# Patient Record
Sex: FEMALE | Race: WHITE | Employment: OTHER | URBAN - METROPOLITAN AREA
[De-identification: names, ages, dates, MRNs, and addresses within clinical notes are randomized per-mention and may not be internally consistent; named-entity substitution may affect disease eponyms.]

---

## 2017-04-11 ENCOUNTER — OFFICE VISIT (OUTPATIENT)
Dept: NEUROLOGY | Facility: CLINIC | Age: 37
End: 2017-04-11

## 2017-04-11 VITALS
HEIGHT: 62 IN | BODY MASS INDEX: 29.44 KG/M2 | RESPIRATION RATE: 16 BRPM | WEIGHT: 160 LBS | SYSTOLIC BLOOD PRESSURE: 116 MMHG | DIASTOLIC BLOOD PRESSURE: 82 MMHG | HEART RATE: 96 BPM

## 2017-04-11 DIAGNOSIS — IMO0002 CHRONIC MIGRAINE: Primary | ICD-10-CM

## 2017-04-11 DIAGNOSIS — M54.2 NECK PAIN: ICD-10-CM

## 2017-04-11 PROCEDURE — 99213 OFFICE O/P EST LOW 20 MIN: CPT | Performed by: OTHER

## 2017-04-11 RX ORDER — DIAZEPAM 5 MG/1
5 TABLET ORAL EVERY 12 HOURS PRN
COMMUNITY

## 2017-04-11 NOTE — PROGRESS NOTES
[facility]    Laurent Ramirez is a 31year old female. HPI:     Patient is being seen in follow-up. I saw her in the clinic last in 11/15/16, for Botox injections (second set).   She does not feel she got as much of a response ibuprofen 200 MG Oral Tab Take 200 mg by mouth every 6 (six) hours as needed for Pain. Disp:  Rfl:    metoprolol Tartrate 25 MG Oral Tab Take 25 mg by mouth.  Disp:  Rfl:    DULoxetine HCl 30 MG Oral Cap DR Particles Take 30 mg by mouth 3 (three) times da dysmetria, normal gait    ASSESSMENT/PLAN:   Assessment  1. Chronic migraine  2.  Neck pain    –Patient would benefit from repeat Botox injections; will use same injection schema as that used 8/17/16 as patient appears to have had a better response to this

## 2017-04-12 ENCOUNTER — TELEPHONE (OUTPATIENT)
Dept: NEUROLOGY | Facility: CLINIC | Age: 37
End: 2017-04-12

## 2017-04-12 NOTE — TELEPHONE ENCOUNTER
BRS on-line for Botox, Faxed clinical notes to [facility] [phone] for Authorization of Approval for Botox injection procedure

## 2017-04-18 NOTE — TELEPHONE ENCOUNTER
Rx was called in to [facility] for Botox 200 units Sig: Inject up to 155 units to head/neck IM by MD every 3 months 1 vial with 3 refills. Per Ping Bhat., they will verify coverage then contact Pt. for co pay and consent for delivery. Wi

## 2017-04-18 NOTE — TELEPHONE ENCOUNTER
Received fax from [facility]  advising of approval for Botox 200 units  cpt code 35196. Per [employer] - no authorization is required for  and cpt code 62750. Reference # [id number]. Will call [facility] pharmacy to request Botox. Robb Perea

## 2017-04-24 ENCOUNTER — TELEPHONE (OUTPATIENT)
Dept: NEUROLOGY | Facility: CLINIC | Age: 37
End: 2017-04-24

## 2017-04-24 NOTE — TELEPHONE ENCOUNTER
Pt. called with update. States she received a phone message advising they called but nothing regarding Botox co pay or consent for delivery. Informed I will call to check on status of Botox. I spoke to [facility] and he said he would [place]

## 2017-04-28 NOTE — TELEPHONE ENCOUNTER
Called [facility] [address] [phone] t/t Christie Arauz to F/U on order called in for Botox, patient is scheduled for procedure on 05/09/2017, Jn Alicea transferred me to their PA Department t/t Alt for information , per Jn Alicea [place] stated more information was nee

## 2017-04-28 NOTE — TELEPHONE ENCOUNTER
Pt. received call from [employer] advising she has a co pay of $1000 and cannot afford to pay upfront even though she will be reimbursed afterwards. She also called Botox Pt. Assistance and unfortunately is not eligible.  Will inform Dr. Carolynn Delgadillo for [phone]

## 2017-05-03 NOTE — TELEPHONE ENCOUNTER
Patient's Botox order will be shipped on 05/03/2017 and delivered 05/04/2017, patient has an appointment for 05/09/2017

## 2017-05-09 ENCOUNTER — OFFICE VISIT (OUTPATIENT)
Dept: NEUROLOGY | Facility: CLINIC | Age: 37
End: 2017-05-09

## 2017-05-09 VITALS
BODY MASS INDEX: 29.44 KG/M2 | HEIGHT: 62 IN | DIASTOLIC BLOOD PRESSURE: 70 MMHG | OXYGEN SATURATION: 97 % | SYSTOLIC BLOOD PRESSURE: 108 MMHG | RESPIRATION RATE: 14 BRPM | WEIGHT: 160 LBS | HEART RATE: 65 BPM

## 2017-05-09 DIAGNOSIS — IMO0002 CHRONIC MIGRAINE: Primary | ICD-10-CM

## 2017-05-09 PROCEDURE — 64615 CHEMODENERV MUSC MIGRAINE: CPT | Performed by: OTHER

## 2017-05-09 NOTE — PROCEDURES
Botox Injection Procedure Note      Consent:    Risks, benefits, and alternatives of botulinum toxin injections were discussed with patient in detail, risks including but not limited to pain, eyelid weakness, double vision, difficulty swallowing, difficult

## 2017-05-19 ENCOUNTER — CHARTING TRANS (OUTPATIENT)
Dept: OTHER | Age: 37
End: 2017-05-19

## 2017-05-19 ASSESSMENT — PAIN SCALES - GENERAL: PAINLEVEL_OUTOF10: 8

## 2017-05-31 ENCOUNTER — TELEPHONE (OUTPATIENT)
Dept: NEUROLOGY | Facility: CLINIC | Age: 37
End: 2017-05-31

## 2017-06-19 ENCOUNTER — TELEPHONE (OUTPATIENT)
Dept: NEUROLOGY | Facility: CLINIC | Age: 37
End: 2017-06-19

## 2017-06-19 NOTE — TELEPHONE ENCOUNTER
Patient was seen for Botox injection on 5/09/17. Since that time, the headaches continue to occur 2-3 times per week, and the intensity of the headaches has only improved slightly. Please advise.

## 2017-06-23 DIAGNOSIS — IMO0002 CHRONIC MIGRAINE: Primary | ICD-10-CM

## 2017-06-23 NOTE — TELEPHONE ENCOUNTER
Spoke with patient. Due to variant significant improvement, will not proceed with further Botox injections for the time being. Did discuss with her second opinion evaluation to a headache clinic, to which she is amenable. Referral placed.

## 2017-06-26 NOTE — TELEPHONE ENCOUNTER
Pt. informed insurance was verified and referral to Headache specialist for 2nd opinion is a covered benefit and does not require authorization. Can proceed with scheduling appt.

## 2017-07-17 ENCOUNTER — TELEPHONE (OUTPATIENT)
Dept: NEUROLOGY | Facility: CLINIC | Age: 37
End: 2017-07-17

## 2018-02-22 ENCOUNTER — TELEPHONE (OUTPATIENT)
Dept: NEUROLOGY | Facility: CLINIC | Age: 38
End: 2018-02-22

## 2018-02-22 NOTE — TELEPHONE ENCOUNTER
Patient's last office visit on 5/9/17. No follow-up appointment since then. No paperwork received at office. Patient will need to make an appointment if paperwork needs to be filled out. Left detailed message for patient notifying her of above.

## 2018-03-01 ENCOUNTER — TELEPHONE (OUTPATIENT)
Dept: NEUROLOGY | Facility: CLINIC | Age: 38
End: 2018-03-01

## 2018-11-03 VITALS
RESPIRATION RATE: 16 BRPM | DIASTOLIC BLOOD PRESSURE: 60 MMHG | TEMPERATURE: 98.1 F | OXYGEN SATURATION: 99 % | SYSTOLIC BLOOD PRESSURE: 108 MMHG | WEIGHT: 161.25 LBS | HEART RATE: 94 BPM | HEIGHT: 63 IN | BODY MASS INDEX: 28.57 KG/M2

## 2020-02-20 ENCOUNTER — TELEPHONE (OUTPATIENT)
Dept: GASTROENTEROLOGY | Facility: CLINIC | Age: 40
End: 2020-02-20

## 2020-02-20 ENCOUNTER — OFFICE VISIT (OUTPATIENT)
Dept: GASTROENTEROLOGY | Facility: CLINIC | Age: 40
End: 2020-02-20
Payer: MEDICARE

## 2020-02-20 VITALS
DIASTOLIC BLOOD PRESSURE: 88 MMHG | WEIGHT: 217 LBS | SYSTOLIC BLOOD PRESSURE: 131 MMHG | HEIGHT: 62 IN | HEART RATE: 91 BPM | BODY MASS INDEX: 39.93 KG/M2

## 2020-02-20 DIAGNOSIS — K21.9 GASTROESOPHAGEAL REFLUX DISEASE, ESOPHAGITIS PRESENCE NOT SPECIFIED: Primary | ICD-10-CM

## 2020-02-20 DIAGNOSIS — K58.2 IRRITABLE BOWEL SYNDROME WITH BOTH CONSTIPATION AND DIARRHEA: ICD-10-CM

## 2020-02-20 PROCEDURE — 99203 OFFICE O/P NEW LOW 30 MIN: CPT | Performed by: INTERNAL MEDICINE

## 2020-02-20 RX ORDER — BENZTROPINE MESYLATE 1 MG/1
TABLET ORAL
COMMUNITY
Start: 2020-02-05

## 2020-02-20 RX ORDER — BENZTROPINE MESYLATE 1 MG/1
1 TABLET ORAL
COMMUNITY
Start: 2020-02-05

## 2020-02-20 RX ORDER — TIZANIDINE 4 MG/1
12 TABLET ORAL
COMMUNITY
Start: 2020-02-05

## 2020-02-20 RX ORDER — NEBIVOLOL HYDROCHLORIDE 10 MG/1
TABLET ORAL
COMMUNITY
Start: 2019-12-22

## 2020-02-20 RX ORDER — NYSTATIN 100000 [USP'U]/G
POWDER TOPICAL
COMMUNITY
Start: 2020-01-15

## 2020-02-20 RX ORDER — GABAPENTIN 300 MG/1
300 CAPSULE ORAL
COMMUNITY
Start: 2020-02-05

## 2020-02-20 RX ORDER — LORAZEPAM 1 MG/1
TABLET ORAL
COMMUNITY
Start: 2020-02-05

## 2020-02-20 RX ORDER — HALOPERIDOL 2 MG/1
TABLET ORAL
COMMUNITY
Start: 2019-09-11

## 2020-02-20 RX ORDER — VENLAFAXINE HYDROCHLORIDE 150 MG/1
150 CAPSULE, EXTENDED RELEASE ORAL
COMMUNITY
Start: 2020-02-05

## 2020-02-20 RX ORDER — DIPHENHYDRAMINE HYDROCHLORIDE 50 MG/ML
INJECTION INTRAMUSCULAR; INTRAVENOUS
COMMUNITY
Start: 2020-01-24

## 2020-02-20 RX ORDER — GALCANEZUMAB 120 MG/ML
INJECTION, SOLUTION SUBCUTANEOUS
COMMUNITY
Start: 2020-01-24

## 2020-02-20 RX ORDER — IBUPROFEN 800 MG
TABLET ORAL
COMMUNITY

## 2020-02-20 NOTE — TELEPHONE ENCOUNTER
Scheduled for:  EGD 27968  Provider Name: Dr. Alicia Velazquez  Date:  2/26/20  Location:  [facility]  Sedation:  MAC  Time:   9247 (pt is aware to arrive at [address] [place] [address])   Prep:  NPO after midnight  Meds/Allergies Reconciled?:  Physician reviewed   Diagnosis with codes:  R

## 2020-02-20 NOTE — PROGRESS NOTES
HPI:    Patient ID: Yoana Zavala is a 42year old female. HPI  The patient is seen at the request of her primary care physician, Dr. Ulysses Keas to discuss gastroesophageal reflux and possible upper endoscopy.     The patient has a 10-year history of sy lb (78.9 kg)  09/14/16 : 180 lb (81.6 kg)  08/17/16 : 180 lb (81.6 kg)           Current Outpatient Medications   Medication Sig Dispense Refill   • Omeprazole Does not apply Powder Take by mouth.      • Benztropine Mesylate 1 MG Oral Tab Take 1 mg by mouth (three) times daily. • pregabalin 75 MG Oral Cap Take 75 mg by mouth 3 (three) times daily. • traZODone 25 mg Oral Tab Take 25 mg by mouth nightly as needed.        • HYDROcodone-acetaminophen  MG Oral Tab Take 1 tablet by mouth every 12 (twel its nature, aggravating and alleviating factors. Dietary and lifestyle modification were discussed.   Smoking cessation and weight loss would be optimal.  We discussed complications of gastroesophageal reflux including erosive esophagitis, stricture or Bar

## 2020-02-20 NOTE — PATIENT INSTRUCTIONS
1.  Try a fiber supplement on a daily basis in an effort to regulate bowel movements. 2.  May continue Prilosec. 3.  Schedule upper endoscopy for reflux with monitored anesthesia care. 4.  Screening colonoscopy at the age of 38.

## 2020-02-26 ENCOUNTER — ANESTHESIA EVENT (OUTPATIENT)
Dept: ENDOSCOPY | Facility: HOSPITAL | Age: 40
End: 2020-02-26
Payer: MEDICARE

## 2020-02-26 ENCOUNTER — HOSPITAL ENCOUNTER (OUTPATIENT)
Facility: HOSPITAL | Age: 40
Setting detail: HOSPITAL OUTPATIENT SURGERY
Discharge: HOME | End: 2020-02-26
Attending: INTERNAL MEDICINE | Admitting: INTERNAL MEDICINE
Payer: MEDICARE

## 2020-02-26 ENCOUNTER — ANESTHESIA (OUTPATIENT)
Dept: ENDOSCOPY | Facility: HOSPITAL | Age: 40
End: 2020-02-26
Payer: MEDICARE

## 2020-02-26 VITALS
BODY MASS INDEX: 39.56 KG/M2 | RESPIRATION RATE: 14 BRPM | HEIGHT: 62 IN | OXYGEN SATURATION: 100 % | DIASTOLIC BLOOD PRESSURE: 83 MMHG | SYSTOLIC BLOOD PRESSURE: 120 MMHG | HEART RATE: 91 BPM | WEIGHT: 215 LBS

## 2020-02-26 DIAGNOSIS — K21.9 GASTROESOPHAGEAL REFLUX DISEASE, ESOPHAGITIS PRESENCE NOT SPECIFIED: ICD-10-CM

## 2020-02-26 LAB — B-HCG UR QL: NEGATIVE

## 2020-02-26 PROCEDURE — 0DB38ZX EXCISION OF LOWER ESOPHAGUS, VIA NATURAL OR ARTIFICIAL OPENING ENDOSCOPIC, DIAGNOSTIC: ICD-10-PCS | Performed by: INTERNAL MEDICINE

## 2020-02-26 PROCEDURE — 0DB68ZX EXCISION OF STOMACH, VIA NATURAL OR ARTIFICIAL OPENING ENDOSCOPIC, DIAGNOSTIC: ICD-10-PCS | Performed by: INTERNAL MEDICINE

## 2020-02-26 PROCEDURE — 0DB98ZX EXCISION OF DUODENUM, VIA NATURAL OR ARTIFICIAL OPENING ENDOSCOPIC, DIAGNOSTIC: ICD-10-PCS | Performed by: INTERNAL MEDICINE

## 2020-02-26 PROCEDURE — 43239 EGD BIOPSY SINGLE/MULTIPLE: CPT | Performed by: INTERNAL MEDICINE

## 2020-02-26 PROCEDURE — 0DB18ZX EXCISION OF UPPER ESOPHAGUS, VIA NATURAL OR ARTIFICIAL OPENING ENDOSCOPIC, DIAGNOSTIC: ICD-10-PCS | Performed by: INTERNAL MEDICINE

## 2020-02-26 RX ORDER — MIDAZOLAM HYDROCHLORIDE 1 MG/ML
INJECTION INTRAMUSCULAR; INTRAVENOUS AS NEEDED
Status: DISCONTINUED | OUTPATIENT
Start: 2020-02-26 | End: 2020-02-26 | Stop reason: SURG

## 2020-02-26 RX ORDER — SODIUM CHLORIDE, SODIUM LACTATE, POTASSIUM CHLORIDE, CALCIUM CHLORIDE 600; 310; 30; 20 MG/100ML; MG/100ML; MG/100ML; MG/100ML
INJECTION, SOLUTION INTRAVENOUS CONTINUOUS
Status: DISCONTINUED | OUTPATIENT
Start: 2020-02-26 | End: 2020-02-26

## 2020-02-26 RX ORDER — NALOXONE HYDROCHLORIDE 0.4 MG/ML
80 INJECTION, SOLUTION INTRAMUSCULAR; INTRAVENOUS; SUBCUTANEOUS AS NEEDED
Status: DISCONTINUED | OUTPATIENT
Start: 2020-02-26 | End: 2020-02-26

## 2020-02-26 RX ORDER — LIDOCAINE HYDROCHLORIDE 10 MG/ML
INJECTION, SOLUTION EPIDURAL; INFILTRATION; INTRACAUDAL; PERINEURAL AS NEEDED
Status: DISCONTINUED | OUTPATIENT
Start: 2020-02-26 | End: 2020-02-26 | Stop reason: SURG

## 2020-02-26 RX ORDER — CETIRIZINE HYDROCHLORIDE 10 MG/1
10 TABLET ORAL DAILY
COMMUNITY

## 2020-02-26 RX ADMIN — MIDAZOLAM HYDROCHLORIDE 2 MG: 1 INJECTION INTRAMUSCULAR; INTRAVENOUS at 08:30:00

## 2020-02-26 RX ADMIN — SODIUM CHLORIDE, SODIUM LACTATE, POTASSIUM CHLORIDE, CALCIUM CHLORIDE: 600; 310; 30; 20 INJECTION, SOLUTION INTRAVENOUS at 08:43:00

## 2020-02-26 RX ADMIN — LIDOCAINE HYDROCHLORIDE 30 MG: 10 INJECTION, SOLUTION EPIDURAL; INFILTRATION; INTRACAUDAL; PERINEURAL at 08:30:00

## 2020-02-26 RX ADMIN — LIDOCAINE HYDROCHLORIDE 10 MG: 10 INJECTION, SOLUTION EPIDURAL; INFILTRATION; INTRACAUDAL; PERINEURAL at 08:32:00

## 2020-02-26 NOTE — OPERATIVE REPORT
[facility] Endoscopy Report      Date of Procedure:  02/26/20        Preoperative Diagnosis:  Gastroesophageal reflux disease      Postoperative Diagnosis:  1. Cervical esophageal nodule  2. Small hiatal hernia  3.   Nonerosive gastroesoph fundic gland polyps along the greater curvature which were biopsied.   Biopsies from the antrum were also obtained and placed in a separate container  The duodenal bulb and post bulbar regions were normal.  Biopsies of the bulb and second portion were obtai

## 2020-02-26 NOTE — ANESTHESIA PREPROCEDURE EVALUATION
Anesthesia PreOp Note    HPI:     Johny Isaac is a 42year old female who presents for preoperative consultation requested by: Stephen Alberts MD    Date of Surgery: 2/26/2020    Procedure(s):  ESOPHAGOGASTRODUODENOSCOPY (EGD)  Indication: G Omeprazole Does not apply Powder, Take by mouth., Disp: , Rfl: , Taking  Benztropine Mesylate 1 MG Oral Tab, Take 1 mg by mouth., Disp: , Rfl: , 2/16/2020  Benztropine Mesylate 1 MG Oral Tab, , Disp: , Rfl: , Taking  Cholecalciferol (VITAMIN D3) 10 MCG (40 DULoxetine HCl 30 MG Oral Cap DR Particles, Take 30 mg by mouth 3 (three) times daily. , Disp: , Rfl: , Not Taking  pregabalin 75 MG Oral Cap, Take 75 mg by mouth 3 (three) times daily. , Disp: , Rfl: , Not Taking  traZODone 25 mg Oral Tab, Take 25 mg by jim Smoking status: Smoker, Current Status Unknown        Types: Cigarettes        Quit date: 6/8/2016        Years since quitting: 3.7      Smokeless tobacco: Never Used      Tobacco comment: 5-6 cigarettes/ day    Substance and Sexual Activity      Alcoh height is 1.575 m (5' 2\") and weight is 97.5 kg (215 lb). Her blood pressure is 125/89 and her pulse is 91. Her respiration is 18 and oxygen saturation is 95%.     02/26/20  0730 02/26/20  0731   BP: 125/89    Pulse: 91    Resp: 18    SpO2: 95%    Weight:

## 2020-02-26 NOTE — H&P
History & Physical Examination    Patient Name: Lorena Perez  MRN: [id number]  CSN: [id number]  YOB: 1980    Diagnosis: Gastroesophageal reflux disease      cetirizine 10 MG Oral Tab, Take 10 mg by mouth daily. , Disp: , Rfl: , 2/25/20 as needed. , Disp: , Rfl: ,  at prn  ibuprofen 200 MG Oral Tab, Take 200 mg by mouth every 6 (six) hours as needed for Pain., Disp: , Rfl: , Not Taking  metoprolol Tartrate 25 MG Oral Tab, Take 25 mg by mouth 2 (two) times daily.   , Disp: , Rfl: , Not [facility] Procedure Laterality Date   • COLONOSCOPY     • KNEE SURGERY Bilateral [id number]    Bilateral knee arthroscopy   • LEEP     • SPINE SURGERY PROCEDURE UNLISTED  Oct. 2015    C5-6 anterior discectomy and fusion.      Family History   Problem Relation Age of

## 2020-02-26 NOTE — ANESTHESIA POSTPROCEDURE EVALUATION
Patient: Lorna Wright    Procedure Summary     Date:  02/26/20 Room / Location:  [facility] ENDOSCOPY 04 / [facility] ENDOSCOPY    Anesthesia Start:  7299 Anesthesia Stop:  0321    Procedure:  ESOPHAGOGASTRODUODENOSCOPY (EGD) (N/A ) Diagnosis:       Kandice Painting

## 2020-12-16 ENCOUNTER — TELEPHONE (OUTPATIENT)
Dept: SCHEDULING | Age: 40
End: 2020-12-16

## 2021-04-19 ENCOUNTER — OFFICE VISIT (OUTPATIENT)
Dept: ALLERGY | Facility: CLINIC | Age: 41
End: 2021-04-19
Payer: MEDICARE

## 2021-04-19 VITALS
HEART RATE: 87 BPM | OXYGEN SATURATION: 98 % | DIASTOLIC BLOOD PRESSURE: 87 MMHG | SYSTOLIC BLOOD PRESSURE: 117 MMHG | RESPIRATION RATE: 18 BRPM

## 2021-04-19 DIAGNOSIS — L50.1 CHRONIC IDIOPATHIC URTICARIA: Primary | ICD-10-CM

## 2021-04-19 DIAGNOSIS — J30.1 SEASONAL ALLERGIC RHINITIS DUE TO POLLEN: ICD-10-CM

## 2021-04-19 DIAGNOSIS — L50.3 DERMATOGRAPHIC URTICARIA: ICD-10-CM

## 2021-04-19 DIAGNOSIS — Z91.018 FOOD ALLERGY: ICD-10-CM

## 2021-04-19 PROCEDURE — 3074F SYST BP LT 130 MM HG: CPT | Performed by: ALLERGY & IMMUNOLOGY

## 2021-04-19 PROCEDURE — 99204 OFFICE O/P NEW MOD 45 MIN: CPT | Performed by: ALLERGY & IMMUNOLOGY

## 2021-04-19 PROCEDURE — 3079F DIAST BP 80-89 MM HG: CPT | Performed by: ALLERGY & IMMUNOLOGY

## 2021-04-19 RX ORDER — LEVOCETIRIZINE DIHYDROCHLORIDE 5 MG/1
5 TABLET, FILM COATED ORAL EVERY 12 HOURS PRN
Qty: 180 TABLET | Refills: 1 | Status: SHIPPED | OUTPATIENT
Start: 2021-04-19 | End: 2021-11-23

## 2021-04-19 RX ORDER — PHENOL 1.4 %
AEROSOL, SPRAY (ML) MUCOUS MEMBRANE
COMMUNITY

## 2021-04-19 RX ORDER — PHYTONADIONE (VIT K1) 100 MCG
TABLET ORAL
COMMUNITY

## 2021-04-19 RX ORDER — ECHINACEA 400 MG
CAPSULE ORAL
COMMUNITY

## 2021-04-19 RX ORDER — LASMIDITAN 100 MG/1
TABLET ORAL
COMMUNITY

## 2021-04-19 RX ORDER — AMPICILLIN TRIHYDRATE 250 MG
CAPSULE ORAL
COMMUNITY

## 2021-04-19 RX ORDER — MONTELUKAST SODIUM 10 MG/1
10 TABLET ORAL NIGHTLY
COMMUNITY

## 2021-04-19 RX ORDER — RIMEGEPANT SULFATE 75 MG/75MG
TABLET, ORALLY DISINTEGRATING ORAL
COMMUNITY

## 2021-04-19 NOTE — PROGRESS NOTES
Lela Degroot is a 47year old female. HPI:   Patient presents with:  Rash: Patient reports itching hives/rash that have been appearing randomly over the last year. Unsure what is causing it.     Patient is a 62-year-old female who presents for al • KNEE SURGERY Bilateral [id number]    Bilateral knee arthroscopy   • LEEP     • SPINE SURGERY PROCEDURE UNLISTED  Oct. 2015    C5-6 anterior discectomy and fusion.       Family History   Problem Relation Age of Onset   • Cancer Mother       Social History severe headache. Take with Haldol/Cogentin. Limit 20/month. • BYSTOLIC 10 MG Oral Tab      • NYSTOP 017462 UNIT/GM External Powder      • tiZANidine HCl 4 MG Oral Tab Take 12 mg by mouth.      • Venlafaxine HCl  MG Oral Capsule SR 24 Hr Take 150 m Comment:Sinus congestion  Seafood                 HIVES  Coconut Flavor          RASH  Fish Oil                RASH      ROS:     Allergic/Immuno:  See HPI  Cardiovascular:  Negative for irregular heartbeat/palpitations, chest pain, edema  Constitutional: allergy  Dermatographic urticaria  Seasonal allergic rhinitis due to pollen    Unable to skin test today due to her dermatographia.     1.  Chronic urticaria with dermatographia component  Handouts on dermatographia provided and reviewed including this bein hours as needed for Allergies.        Imaging & Referrals:  None     4/19/2021  Moira Chisholm MD      If medication samples were provided today, they were provided solely for patient education and training related to self administration of these medicat

## 2021-04-19 NOTE — PATIENT INSTRUCTIONS
1.  Chronic urticaria with dermatographia component  Handouts on dermatographia provided and reviewed including this being the most common physical form of hives. Usually resolves within months to a few years.   Reviewed the mainstays of treatment are supp

## 2021-11-23 RX ORDER — LEVOCETIRIZINE DIHYDROCHLORIDE 5 MG/1
5 TABLET, FILM COATED ORAL EVERY 12 HOURS PRN
Qty: 180 TABLET | Refills: 0 | Status: SHIPPED | OUTPATIENT
Start: 2021-11-23

## 2021-11-23 NOTE — TELEPHONE ENCOUNTER
Name from pharmacy: LEVOCETIRIZINE 5MG TABLETS          Will file in chart as: LEVOCETIRIZINE 5 MG Oral Tab    Sig: TAKE 1 TABLET(5 MG) BY MOUTH EVERY 12 HOURS AS NEEDED FOR ALLERGIES    Disp:  180 tablet    Refills:  1 (Pharmacy requested: Not specified)

## 2022-02-08 ENCOUNTER — APPOINTMENT (OUTPATIENT)
Dept: CT IMAGING | Age: 42
End: 2022-02-08
Attending: EMERGENCY MEDICINE

## 2022-02-08 ENCOUNTER — APPOINTMENT (OUTPATIENT)
Dept: GENERAL RADIOLOGY | Age: 42
End: 2022-02-08
Attending: EMERGENCY MEDICINE

## 2022-02-08 ENCOUNTER — HOSPITAL ENCOUNTER (OUTPATIENT)
Age: 42
Setting detail: OBSERVATION
Discharge: HOME | End: 2022-02-08
Attending: EMERGENCY MEDICINE | Admitting: INTERNAL MEDICINE

## 2022-02-08 VITALS
OXYGEN SATURATION: 98 % | RESPIRATION RATE: 18 BRPM | WEIGHT: 179.9 LBS | HEIGHT: 61 IN | TEMPERATURE: 97.5 F | HEART RATE: 74 BPM | DIASTOLIC BLOOD PRESSURE: 99 MMHG | SYSTOLIC BLOOD PRESSURE: 155 MMHG | BODY MASS INDEX: 33.96 KG/M2

## 2022-02-08 DIAGNOSIS — E87.6 HYPOKALEMIA: ICD-10-CM

## 2022-02-08 DIAGNOSIS — R07.9 CHEST PAIN, UNSPECIFIED TYPE: Primary | ICD-10-CM

## 2022-02-08 LAB
ALBUMIN SERPL-MCNC: 3.7 G/DL (ref 3.6–5.1)
ALBUMIN/GLOB SERPL: 1.1 {RATIO} (ref 1–2.4)
ALP SERPL-CCNC: 70 UNITS/L (ref 45–117)
ALT SERPL-CCNC: 31 UNITS/L
ANION GAP SERPL CALC-SCNC: 14 MMOL/L (ref 10–20)
APTT PPP: 32 SEC (ref 22–30)
AST SERPL-CCNC: 18 UNITS/L
BASOPHILS # BLD: 0.1 K/MCL (ref 0–0.3)
BASOPHILS NFR BLD: 1 %
BILIRUB SERPL-MCNC: 0.5 MG/DL (ref 0.2–1)
BUN SERPL-MCNC: 9 MG/DL (ref 6–20)
BUN/CREAT SERPL: 18 (ref 7–25)
CALCIUM SERPL-MCNC: 8.2 MG/DL (ref 8.4–10.2)
CHLORIDE SERPL-SCNC: 102 MMOL/L (ref 98–107)
CO2 SERPL-SCNC: 24 MMOL/L (ref 21–32)
CREAT SERPL-MCNC: 0.51 MG/DL (ref 0.51–0.95)
D DIMER PPP FEU-MCNC: 0.68 MG/L (FEU)
DEPRECATED RDW RBC: 45.7 FL (ref 39–50)
EOSINOPHIL # BLD: 0.4 K/MCL (ref 0–0.5)
EOSINOPHIL NFR BLD: 4 %
ERYTHROCYTE [DISTWIDTH] IN BLOOD: 14.3 % (ref 11–15)
FASTING DURATION TIME PATIENT: ABNORMAL H
GFR SERPLBLD BASED ON 1.73 SQ M-ARVRAT: >90 ML/MIN
GLOBULIN SER-MCNC: 3.3 G/DL (ref 2–4)
GLUCOSE SERPL-MCNC: 145 MG/DL (ref 70–99)
HCT VFR BLD CALC: 41.8 % (ref 36–46.5)
HGB BLD-MCNC: 14.3 G/DL (ref 12–15.5)
IMM GRANULOCYTES # BLD AUTO: 0.1 K/MCL (ref 0–0.2)
IMM GRANULOCYTES # BLD: 1 %
INR PPP: 1
LYMPHOCYTES # BLD: 3.5 K/MCL (ref 1–4.8)
LYMPHOCYTES NFR BLD: 33 %
MAGNESIUM SERPL-MCNC: 1.9 MG/DL (ref 1.7–2.4)
MCH RBC QN AUTO: 29.8 PG (ref 26–34)
MCHC RBC AUTO-ENTMCNC: 34.2 G/DL (ref 32–36.5)
MCV RBC AUTO: 87.1 FL (ref 78–100)
MONOCYTES # BLD: 0.5 K/MCL (ref 0.3–0.9)
MONOCYTES NFR BLD: 5 %
NEUTROPHILS # BLD: 6.3 K/MCL (ref 1.8–7.7)
NEUTROPHILS NFR BLD: 56 %
NRBC BLD MANUAL-RTO: 0 /100 WBC
PLATELET # BLD AUTO: 358 K/MCL (ref 140–450)
POTASSIUM SERPL-SCNC: 3.3 MMOL/L (ref 3.4–5.1)
PROT SERPL-MCNC: 7 G/DL (ref 6.4–8.2)
PROTHROMBIN TIME: 10.7 SEC (ref 9.7–11.8)
RBC # BLD: 4.8 MIL/MCL (ref 4–5.2)
SARS-COV-2 RNA RESP QL NAA+PROBE: NOT DETECTED
SERVICE CMNT-IMP: NORMAL
SERVICE CMNT-IMP: NORMAL
SODIUM SERPL-SCNC: 137 MMOL/L (ref 135–145)
TROPONIN I SERPL DL<=0.01 NG/ML-MCNC: 14 NG/L
TROPONIN I SERPL DL<=0.01 NG/ML-MCNC: 17 NG/L
WBC # BLD: 10.9 K/MCL (ref 4.2–11)

## 2022-02-08 PROCEDURE — G0378 HOSPITAL OBSERVATION PER HR: HCPCS

## 2022-02-08 PROCEDURE — 71275 CT ANGIOGRAPHY CHEST: CPT

## 2022-02-08 PROCEDURE — 84484 ASSAY OF TROPONIN QUANT: CPT | Performed by: EMERGENCY MEDICINE

## 2022-02-08 PROCEDURE — 36415 COLL VENOUS BLD VENIPUNCTURE: CPT

## 2022-02-08 PROCEDURE — 83735 ASSAY OF MAGNESIUM: CPT | Performed by: EMERGENCY MEDICINE

## 2022-02-08 PROCEDURE — 85610 PROTHROMBIN TIME: CPT | Performed by: EMERGENCY MEDICINE

## 2022-02-08 PROCEDURE — 93005 ELECTROCARDIOGRAM TRACING: CPT | Performed by: EMERGENCY MEDICINE

## 2022-02-08 PROCEDURE — 99285 EMERGENCY DEPT VISIT HI MDM: CPT

## 2022-02-08 PROCEDURE — 87635 SARS-COV-2 COVID-19 AMP PRB: CPT | Performed by: EMERGENCY MEDICINE

## 2022-02-08 PROCEDURE — 85025 COMPLETE CBC W/AUTO DIFF WBC: CPT | Performed by: EMERGENCY MEDICINE

## 2022-02-08 PROCEDURE — 80053 COMPREHEN METABOLIC PANEL: CPT | Performed by: EMERGENCY MEDICINE

## 2022-02-08 PROCEDURE — 71045 X-RAY EXAM CHEST 1 VIEW: CPT

## 2022-02-08 PROCEDURE — 85379 FIBRIN DEGRADATION QUANT: CPT | Performed by: PHYSICIAN ASSISTANT

## 2022-02-08 PROCEDURE — 85730 THROMBOPLASTIN TIME PARTIAL: CPT | Performed by: EMERGENCY MEDICINE

## 2022-02-08 PROCEDURE — 10002803 HB RX 637: Performed by: EMERGENCY MEDICINE

## 2022-02-08 PROCEDURE — 10002805 HB CONTRAST AGENT: Performed by: EMERGENCY MEDICINE

## 2022-02-08 RX ORDER — 0.9 % SODIUM CHLORIDE 0.9 %
2 VIAL (ML) INJECTION EVERY 12 HOURS SCHEDULED
Status: DISCONTINUED | OUTPATIENT
Start: 2022-02-08 | End: 2022-02-08 | Stop reason: HOSPADM

## 2022-02-08 RX ORDER — MONTELUKAST SODIUM 10 MG/1
10 TABLET ORAL DAILY
COMMUNITY

## 2022-02-08 RX ORDER — PANTOPRAZOLE SODIUM 20 MG/1
20 TABLET, DELAYED RELEASE ORAL DAILY
Qty: 14 TABLET | Refills: 0 | Status: SHIPPED | OUTPATIENT
Start: 2022-02-08

## 2022-02-08 RX ORDER — ALPRAZOLAM 0.5 MG/1
0.5 TABLET ORAL 3 TIMES DAILY PRN
COMMUNITY

## 2022-02-08 RX ORDER — CETIRIZINE HYDROCHLORIDE 10 MG/1
10 TABLET ORAL DAILY PRN
COMMUNITY

## 2022-02-08 RX ORDER — DIPHENHYDRAMINE HCL 25 MG
50 CAPSULE ORAL EVERY 6 HOURS PRN
COMMUNITY

## 2022-02-08 RX ORDER — POTASSIUM CHLORIDE 10 MEQ
40 TABLET, EXT RELEASE, PARTICLES/CRYSTALS ORAL ONCE
Status: COMPLETED | OUTPATIENT
Start: 2022-02-08 | End: 2022-02-08

## 2022-02-08 RX ORDER — ASPIRIN 81 MG/1
324 TABLET, CHEWABLE ORAL ONCE
Status: DISCONTINUED | OUTPATIENT
Start: 2022-02-08 | End: 2022-02-08 | Stop reason: HOSPADM

## 2022-02-08 RX ORDER — ESCITALOPRAM OXALATE 10 MG/1
10 TABLET ORAL DAILY
COMMUNITY

## 2022-02-08 RX ADMIN — POTASSIUM CHLORIDE 40 MEQ: 750 TABLET, FILM COATED, EXTENDED RELEASE ORAL at 16:58

## 2022-02-08 RX ADMIN — IOHEXOL 80 ML: 350 INJECTION, SOLUTION INTRAVENOUS at 18:17

## 2022-02-08 ASSESSMENT — COGNITIVE AND FUNCTIONAL STATUS - GENERAL
DO YOU HAVE DIFFICULTY DRESSING OR BATHING: NO
DO YOU HAVE SERIOUS DIFFICULTY WALKING OR CLIMBING STAIRS: NO
ARE YOU BLIND OR DO YOU HAVE SERIOUS DIFFICULTY SEEING, EVEN WHEN WEARING GLASSES: NO
BECAUSE OF A PHYSICAL, MENTAL, OR EMOTIONAL CONDITION, DO YOU HAVE DIFFICULTY DOING ERRANDS ALONE: NO
BECAUSE OF A PHYSICAL, MENTAL, OR EMOTIONAL CONDITION, DO YOU HAVE SERIOUS DIFFICULTY CONCENTRATING, REMEMBERING OR MAKING DECISIONS: NO
ARE YOU DEAF OR DO YOU HAVE SERIOUS DIFFICULTY  HEARING: NO

## 2022-02-08 ASSESSMENT — PATIENT HEALTH QUESTIONNAIRE - PHQ9
IS PATIENT ABLE TO COMPLETE PHQ2 OR PHQ9: YES
SUM OF ALL RESPONSES TO PHQ9 QUESTIONS 1 AND 2: 0
CLINICAL INTERPRETATION OF PHQ2 SCORE: NO FURTHER SCREENING NEEDED
SUM OF ALL RESPONSES TO PHQ9 QUESTIONS 1 AND 2: 0
CLINICAL INTERPRETATION OF PHQ2 SCORE: NO FURTHER SCREENING NEEDED
1. LITTLE INTEREST OR PLEASURE IN DOING THINGS: NOT AT ALL
2. FEELING DOWN, DEPRESSED OR HOPELESS: NOT AT ALL
SUM OF ALL RESPONSES TO PHQ9 QUESTIONS 1 AND 2: 0

## 2022-02-08 ASSESSMENT — LIFESTYLE VARIABLES
HOW MANY STANDARD DRINKS CONTAINING ALCOHOL DO YOU HAVE ON A TYPICAL DAY: 0,1 OR 2
AUDIT-C TOTAL SCORE: 0
HOW OFTEN DO YOU HAVE A DRINK CONTAINING ALCOHOL: NEVER
HOW OFTEN DO YOU HAVE 6 OR MORE DRINKS ON ONE OCCASION: NEVER
ALCOHOL_USE_STATUS: NO OR LOW RISK WITH VALIDATED TOOL

## 2022-02-08 ASSESSMENT — HEART SCORE
HEART SCORE: 1
TROPONIN: EQUAL OR LESS THAN NORMAL LIMIT
AGE: LESS THAN OR EQUAL TO 45
RISK FACTORS: 1-2 RISK FACTORS
HISTORY: SLIGHTLY SUSPICIOUS
EKG: NORMAL

## 2022-02-08 ASSESSMENT — ACTIVITIES OF DAILY LIVING (ADL)
ADL_BEFORE_ADMISSION: INDEPENDENT
CHRONIC_PAIN_PRESENT: NO
RECENT_DECLINE_ADL: NO
ADL_SHORT_OF_BREATH: NO
ADL_SCORE: 12

## 2022-02-08 ASSESSMENT — PAIN SCALES - GENERAL
PAINLEVEL_OUTOF10: 0
PAINLEVEL_OUTOF10: 4

## 2022-02-09 LAB
ATRIAL RATE (BPM): 72
ATRIAL RATE (BPM): 93
P AXIS (DEGREES): 29
P AXIS (DEGREES): 39
PR-INTERVAL (MSEC): 140
PR-INTERVAL (MSEC): 148
QRS-INTERVAL (MSEC): 80
QRS-INTERVAL (MSEC): 84
QT-INTERVAL (MSEC): 380
QT-INTERVAL (MSEC): 416
QTC: 455
QTC: 473
R AXIS (DEGREES): 60
R AXIS (DEGREES): 65
REPORT TEXT: NORMAL
REPORT TEXT: NORMAL
T AXIS (DEGREES): 43
T AXIS (DEGREES): 49
VENTRICULAR RATE EKG/MIN (BPM): 72
VENTRICULAR RATE EKG/MIN (BPM): 93

## 2022-03-01 RX ORDER — LEVOCETIRIZINE DIHYDROCHLORIDE 5 MG/1
TABLET, FILM COATED ORAL
Qty: 180 TABLET | Refills: 0 | Status: SHIPPED | OUTPATIENT
Start: 2022-03-01 | End: 2022-03-22

## 2022-03-22 ENCOUNTER — LAB ENCOUNTER (OUTPATIENT)
Dept: LAB | Age: 42
End: 2022-03-22
Attending: ALLERGY & IMMUNOLOGY
Payer: MEDICARE

## 2022-03-22 ENCOUNTER — OFFICE VISIT (OUTPATIENT)
Dept: ALLERGY | Facility: CLINIC | Age: 42
End: 2022-03-22
Payer: MEDICARE

## 2022-03-22 DIAGNOSIS — Z91.018 FOOD ALLERGY: ICD-10-CM

## 2022-03-22 DIAGNOSIS — L50.3 DERMATOGRAPHIC URTICARIA: ICD-10-CM

## 2022-03-22 DIAGNOSIS — J30.1 SEASONAL ALLERGIC RHINITIS DUE TO POLLEN: ICD-10-CM

## 2022-03-22 DIAGNOSIS — L50.1 CHRONIC IDIOPATHIC URTICARIA: Primary | ICD-10-CM

## 2022-03-22 PROCEDURE — 36415 COLL VENOUS BLD VENIPUNCTURE: CPT | Performed by: ALLERGY & IMMUNOLOGY

## 2022-03-22 PROCEDURE — 99214 OFFICE O/P EST MOD 30 MIN: CPT | Performed by: ALLERGY & IMMUNOLOGY

## 2022-03-22 PROCEDURE — 82785 ASSAY OF IGE: CPT | Performed by: ALLERGY & IMMUNOLOGY

## 2022-03-22 PROCEDURE — 86003 ALLG SPEC IGE CRUDE XTRC EA: CPT | Performed by: ALLERGY & IMMUNOLOGY

## 2022-03-22 RX ORDER — FAMOTIDINE 20 MG/1
20 TABLET, FILM COATED ORAL ONCE
COMMUNITY

## 2022-03-22 RX ORDER — LEVOCETIRIZINE DIHYDROCHLORIDE 5 MG/1
5 TABLET, FILM COATED ORAL EVERY 12 HOURS PRN
Qty: 180 TABLET | Refills: 2 | Status: SHIPPED | OUTPATIENT
Start: 2022-03-22

## 2022-03-22 RX ORDER — EPINEPHRINE 0.3 MG/.3ML
INJECTION SUBCUTANEOUS
Qty: 1 EACH | Refills: 0 | Status: SHIPPED | OUTPATIENT
Start: 2022-03-22

## 2022-03-22 RX ORDER — HYDROXYZINE HYDROCHLORIDE 25 MG/1
25 TABLET, FILM COATED ORAL NIGHTLY
COMMUNITY

## 2022-03-22 NOTE — PATIENT INSTRUCTIONS
#1 chronic idiopathic urticaria with a dermatographia component  Recent increase in hives over the past month. Frequency of hives is every other day. Denies associated angioedema or respiratory issues. Hives occurring in spite of Xyzal 3-4 times per day, Pepcid once a day and Atarax at bedtime    Recs:  Continue with current Xyzal 5 mg up to 4 times per day, Atarax at bedtime and Pepcid up to twice a day  Reviewed Xolair as a potential treatment option for underlying chronic spontaneous urticaria  Patient to contact my office if interested in pursuing with Xolair as a treatment option     #2 allergic rhinitis  Check serum IgE profile to environmental allergens to screen for triggers. There are pets in the home including dogs  Continue with Singulair and Xyzal  May add Flonase 2 sprays per nostril once a day if having prominent nasal congestion postnasal drip    #3 Food allergies  Still avoiding shellfish. Patient reports prior negative serum IgE testing through her PCP in the past.  No records to date. Patient defers retesting at this time and will avoidance. EpiPen renewed.   Patient to contact my office if interested in retesting to shellfish    #4 COVID vaccine up-to-date x3 doses by maternal per patient report    #5 flu vaccine up-to-date per patient report

## 2022-03-24 ENCOUNTER — TELEPHONE (OUTPATIENT)
Dept: ALLERGY | Facility: CLINIC | Age: 42
End: 2022-03-24

## 2022-03-24 LAB

## 2022-03-24 NOTE — TELEPHONE ENCOUNTER
Spoke with patient. Verified name and . Informed patient to test results per Dr. Nate Ragsdale. Patient verbalizes understanding, no further questions at this time. ----- Message from Sean Lopez MD sent at 3/24/2022  2:19 PM CDT -----   Please call patient with negative serum IgE panel to common environmental allergens.   In addition her total IgE level was normal at 22.9

## 2022-05-03 ENCOUNTER — TELEPHONE (OUTPATIENT)
Dept: ALLERGY | Facility: CLINIC | Age: 42
End: 2022-05-03

## 2022-05-03 NOTE — TELEPHONE ENCOUNTER
Fax received from [employer]. Plan does not cover this medication. Please call plan at [phone] to initiate a PA, or to change medication. Patient ID is [id number].

## 2022-05-17 NOTE — TELEPHONE ENCOUNTER
This request has been approved. PA Case: [id number], Status: Approved,   Coverage Starts on: 1/1/2022 12:00:00 AM, Coverage Ends on: 12/31/2022 12:00:00 AM.     Left message informing pharmacy that prior authorization has been approved. Patient notified. She will contact pharmacy to get refill.

## 2022-06-24 ENCOUNTER — OFFICE VISIT (OUTPATIENT)
Dept: OTOLARYNGOLOGY | Facility: CLINIC | Age: 42
End: 2022-06-24
Payer: MEDICARE

## 2022-06-24 VITALS — TEMPERATURE: 99 F

## 2022-06-24 DIAGNOSIS — G47.33 OBSTRUCTIVE SLEEP APNEA: Primary | ICD-10-CM

## 2022-06-24 PROCEDURE — 99203 OFFICE O/P NEW LOW 30 MIN: CPT | Performed by: OTOLARYNGOLOGY

## 2022-06-24 RX ORDER — NEBIVOLOL 5 MG/1
TABLET ORAL
COMMUNITY
Start: 2022-06-21

## 2022-06-24 RX ORDER — HALOPERIDOL 5 MG
TABLET ORAL
COMMUNITY
Start: 2022-06-21

## 2022-06-24 RX ORDER — NEBIVOLOL 20 MG/1
TABLET ORAL
COMMUNITY
Start: 2021-12-15

## 2022-06-24 RX ORDER — AZELASTINE 1 MG/ML
2 SPRAY, METERED NASAL 2 TIMES DAILY
Qty: 30 ML | Refills: 3 | Status: SHIPPED | OUTPATIENT
Start: 2022-06-24

## 2022-06-24 RX ORDER — GABAPENTIN 400 MG/1
CAPSULE ORAL
COMMUNITY
Start: 2021-01-09

## 2022-06-24 RX ORDER — ORPHENADRINE CITRATE 100 MG/1
TABLET, EXTENDED RELEASE ORAL
COMMUNITY
Start: 2022-06-21

## 2022-07-19 ENCOUNTER — LAB REQUISITION (OUTPATIENT)
Dept: LAB | Facility: HOSPITAL | Age: 42
End: 2022-07-19
Payer: MEDICARE

## 2022-07-19 ENCOUNTER — APPOINTMENT (OUTPATIENT)
Dept: URBAN - METROPOLITAN AREA CLINIC 244 | Age: 42
Setting detail: DERMATOLOGY
End: 2022-07-24

## 2022-07-19 DIAGNOSIS — L72.8 OTHER FOLLICULAR CYSTS OF THE SKIN AND SUBCUTANEOUS TISSUE: ICD-10-CM

## 2022-07-19 DIAGNOSIS — L91.8 OTHER HYPERTROPHIC DISORDERS OF THE SKIN: ICD-10-CM

## 2022-07-19 DIAGNOSIS — L81.4 OTHER MELANIN HYPERPIGMENTATION: ICD-10-CM

## 2022-07-19 DIAGNOSIS — L82.1 OTHER SEBORRHEIC KERATOSIS: ICD-10-CM

## 2022-07-19 DIAGNOSIS — D22 MELANOCYTIC NEVI: ICD-10-CM

## 2022-07-19 DIAGNOSIS — L30.4 ERYTHEMA INTERTRIGO: ICD-10-CM

## 2022-07-19 DIAGNOSIS — L29.8 OTHER PRURITUS: ICD-10-CM

## 2022-07-19 PROBLEM — D22.5 MELANOCYTIC NEVI OF TRUNK: Status: ACTIVE | Noted: 2022-07-19

## 2022-07-19 PROCEDURE — 99203 OFFICE O/P NEW LOW 30 MIN: CPT | Mod: 25

## 2022-07-19 PROCEDURE — OTHER COUNSELING: OTHER

## 2022-07-19 PROCEDURE — 88305 TISSUE EXAM BY PATHOLOGIST: CPT | Performed by: DERMATOLOGY

## 2022-07-19 PROCEDURE — OTHER OTC TREATMENT REGIMEN: OTHER

## 2022-07-19 PROCEDURE — 11440 EXC FACE-MM B9+MARG 0.5 CM/<: CPT

## 2022-07-19 PROCEDURE — OTHER PUNCH EXCISION: OTHER

## 2022-07-19 PROCEDURE — 88304 TISSUE EXAM BY PATHOLOGIST: CPT | Performed by: DERMATOLOGY

## 2022-07-19 PROCEDURE — OTHER PRESCRIPTION MEDICATION MANAGEMENT: OTHER

## 2022-07-19 PROCEDURE — OTHER SKIN TAG REMOVAL (COSMETIC): OTHER

## 2022-07-19 ASSESSMENT — LOCATION SIMPLE DESCRIPTION DERM
LOCATION SIMPLE: RIGHT BREAST
LOCATION SIMPLE: LEFT BREAST
LOCATION SIMPLE: RIGHT CHEEK
LOCATION SIMPLE: CHEST
LOCATION SIMPLE: RIGHT UPPER BACK
LOCATION SIMPLE: LEFT UPPER BACK
LOCATION SIMPLE: RIGHT THIGH

## 2022-07-19 ASSESSMENT — LOCATION DETAILED DESCRIPTION DERM
LOCATION DETAILED: RIGHT INFERIOR CENTRAL MALAR CHEEK
LOCATION DETAILED: UPPER STERNUM
LOCATION DETAILED: RIGHT ANTERIOR PROXIMAL THIGH
LOCATION DETAILED: LEFT INFRAMAMMARY CREASE (INNER QUADRANT)
LOCATION DETAILED: RIGHT INFRAMAMMARY CREASE (INNER QUADRANT)
LOCATION DETAILED: RIGHT SUPERIOR MEDIAL UPPER BACK
LOCATION DETAILED: LEFT MEDIAL UPPER BACK

## 2022-07-19 ASSESSMENT — LOCATION ZONE DERM
LOCATION ZONE: TRUNK
LOCATION ZONE: FACE
LOCATION ZONE: LEG

## 2022-07-19 NOTE — PROCEDURE: SKIN TAG REMOVAL (COSMETIC)
Price (Use Numbers Only, No Special Characters Or $): 16 Price (Use Numbers Only, No Special Characters Or $):

## 2022-07-19 NOTE — PROCEDURE: OTC TREATMENT REGIMEN
Patient Specific Otc Recommendations (Will Not Stick From Patient To Patient): Hydrocortisone Cream apply to AA BID for 1 week on, 1 week off
Detail Level: Zone

## 2022-07-19 NOTE — PROCEDURE: PRESCRIPTION MEDICATION MANAGEMENT
Continue Regimen: Ketoconazole 2% Cream prescribed by PCP
Render In Strict Bullet Format?: No
Detail Level: Simple

## 2022-07-19 NOTE — PROCEDURE: PUNCH EXCISION
Helical Rim Text: The closure involved the helical rim.
Billing Type: Third-Party Bill
Intermediate Repair Preamble Text (Leave Blank If You Do Not Want): Undermining was performed with blunt dissection.
Complex Requirements: Involvement Of Free Margin?: No
5 Mm Punch Excision Text: A 5 mm punch was used to make an initial incision over the lesion.  After this overlying column of skin was removed, blunt dissection was used to free the lesion from the surrounding tissues and the lesion was extirpated through the surgical opening made by the punch biopsy.
2.5 Mm Punch Excision Text: A 2.5 mm punch was used to make an initial incision over the lesion.  After this overlying column of skin was removed, blunt dissection was used to free the lesion from the surrounding tissues and the lesion was extirpated through the surgical opening made by the punch biopsy.
Repair Type: None (Simple)
Use Complex Repair Preambles?: Yes
Epidermal Closure: simple interrupted
4.5 Mm Punch Excision Text: A 4.5 mm punch was used to make an initial incision over the lesion.  After this overlying column of skin was removed, blunt dissection was used to free the lesion from the surrounding tissues and the lesion was extirpated through the surgical opening made by the punch biopsy.
Anesthesia Volume In Cc: 0
Nostril Rim Text: The closure involved the nostril rim.
7 Mm Punch Excision Text: A 7 mm punch was used to make an initial incision over the lesion.  After this overlying column of skin was removed, blunt dissection was used to free the lesion from the surrounding tissues and the lesion was extirpated through the surgical opening made by the punch biopsy.
Deep Sutures: 4-0 Vicryl
Anesthesia Volume In Cc: 4
Dressing: dry sterile dressing
8 Mm Punch Excision Text: A 8 mm punch was used to make an initial incision over the lesion.  After this overlying column of skin was removed, blunt dissection was used to free the lesion from the surrounding tissues and the lesion was extirpated through the surgical opening made by the punch biopsy.
3 Mm Punch Excision Text: A 3 mm punch was used to make an initial incision over the lesion.  After this overlying column of skin was removed, blunt dissection was used to free the lesion from the surrounding tissues and the lesion was extirpated through the surgical opening made by the punch biopsy.
12 Mm Punch Excision Text: A 12 mm punch was used to make an initial incision over the lesion.  After this overlying column of skin was removed, blunt dissection was used to free the lesion from the surrounding tissues and the lesion was extirpated through the surgical opening made by the punch biopsy.
Detail Level: Detailed
Consent was obtained from the patient. The risks and benefits to therapy were discussed in detail. Specifically, the risks of infection, scarring, bleeding, prolonged wound healing, incomplete removal, allergy to anesthesia, nerve injury and recurrence were addressed. Prior to the procedure, the treatment site was clearly identified and confirmed by the patient. All components of Universal Protocol/PAUSE Rule completed.
Excision Depth: adipose tissue
Estimated Blood Loss (Cc): minimal
3.5 Mm Punch Excision Text: A 3.5 mm punch was used to make an initial incision over the lesion.  After this overlying column of skin was removed, blunt dissection was used to free the lesion from the surrounding tissues and the lesion was extirpated through the surgical opening made by the punch biopsy.
Anesthesia Type: 1% lidocaine with epinephrine
6 Mm Punch Excision Text: A 6 mm punch was used to make an initial incision over the lesion.  After this overlying column of skin was removed, blunt dissection was used to free the lesion from the surrounding tissues and the lesion was extirpated through the surgical opening made by the punch biopsy.
Hemostasis: Electrocautery
Wound Care: Petrolatum
Suture Removal: 7 days
Medical Necessity Clause: The excision was medically necessary because the lesion which was excised was
2 Mm Punch Excision Text: A 2 mm punch was used to make an initial incision over the lesion.  After this overlying column of skin was removed, blunt dissection was used to free the lesion from the surrounding tissues and the lesion was extirpated through the surgical opening made by the punch biopsy.
10 Mm Punch Excision Text: A 10 mm punch was used to make an initial incision over the lesion.  After this overlying column of skin was removed, blunt dissection was used to free the lesion from the surrounding tissues and the lesion was extirpated through the surgical opening made by the punch biopsy.
Epidermal Sutures: 5-0 Nylon
Path Notes (To The Dermatopathologist): Please check margins.
Additional Anesthesia Volume In Cc: 5
Medical Necessity Clause: This procedure was medically necessary because the lesion that was treated was:
Intermediate / Complex Repair - Final Wound Length In Cm: 0.5
Retention Suture Text: Retention sutures were placed to support the closure and prevent dehiscence.
Excision Method: 4 mm Punch
1.5 Mm Punch Excision Text: A 1.5 mm punch was used to make an initial incision over the lesion.  After this overlying column of skin was removed, blunt dissection was used to free the lesion from the surrounding tissues and the lesion was extirpated through the surgical opening made by the punch biopsy.
Post-Care Instructions: I reviewed with the patient in detail post-care instructions. Patient is not to engage in any heavy lifting, exercise, or swimming for the next 14 days. Should the patient develop any fevers, chills, bleeding, severe pain patient will contact the office immediately.
4 Mm Punch Excision Text: A 4 mm punch was used to make an initial incision over the lesion.  After this overlying column of skin was removed, blunt dissection was used to free the lesion from the surrounding tissues and the lesion was extirpated through the surgical opening made by the punch biopsy.
Undermining Type: Entire Wound
Debridement Text: The wound edges were debrided prior to proceeding with the closure to facilitate wound healing.
Size Of Lesion In Cm: 0.4
Purse String (Intermediate) Text: Given the location of the defect and the characteristics of the surrounding skin a purse string intermediate closure was deemed most appropriate.  Undermining was performed circumfirentially around the surgical defect.  A purse string suture was then placed and tightened.
Vermilion Border Text: The closure involved the vermilion border.
Complex Repair Preamble Text (Leave Blank If You Do Not Want): Extensive wide undermining was performed.

## 2022-07-26 ENCOUNTER — APPOINTMENT (OUTPATIENT)
Dept: URBAN - METROPOLITAN AREA CLINIC 244 | Age: 42
Setting detail: DERMATOLOGY
End: 2022-07-26

## 2022-07-26 DIAGNOSIS — Z48.02 ENCOUNTER FOR REMOVAL OF SUTURES: ICD-10-CM

## 2022-07-26 PROCEDURE — OTHER SUTURE REMOVAL (GLOBAL PERIOD): OTHER

## 2022-07-26 PROCEDURE — 99024 POSTOP FOLLOW-UP VISIT: CPT

## 2022-07-26 ASSESSMENT — LOCATION ZONE DERM: LOCATION ZONE: FACE

## 2022-07-26 ASSESSMENT — LOCATION DETAILED DESCRIPTION DERM: LOCATION DETAILED: RIGHT INFERIOR CENTRAL MALAR CHEEK

## 2022-07-26 ASSESSMENT — LOCATION SIMPLE DESCRIPTION DERM: LOCATION SIMPLE: RIGHT CHEEK

## 2022-07-26 NOTE — PROCEDURE: SUTURE REMOVAL (GLOBAL PERIOD)
Add 44175 Cpt? (Important Note: In 2017 The Use Of 83215 Is Being Tracked By Cms To Determine Future Global Period Reimbursement For Global Periods): yes
Detail Level: Detailed

## 2022-07-29 ENCOUNTER — OFFICE VISIT (OUTPATIENT)
Dept: OTOLARYNGOLOGY | Facility: CLINIC | Age: 42
End: 2022-07-29
Payer: MEDICARE

## 2022-07-29 DIAGNOSIS — G47.33 OBSTRUCTIVE SLEEP APNEA: Primary | ICD-10-CM

## 2022-07-29 PROCEDURE — 99213 OFFICE O/P EST LOW 20 MIN: CPT | Performed by: OTOLARYNGOLOGY

## 2022-09-01 ENCOUNTER — HOSPITAL ENCOUNTER (OUTPATIENT)
Dept: MAMMOGRAPHY | Facility: HOSPITAL | Age: 42
Discharge: HOME | End: 2022-09-01
Attending: FAMILY MEDICINE
Payer: MEDICARE

## 2022-09-01 ENCOUNTER — HOSPITAL ENCOUNTER (OUTPATIENT)
Dept: ULTRASOUND IMAGING | Facility: HOSPITAL | Age: 42
Discharge: HOME | End: 2022-09-01
Attending: FAMILY MEDICINE
Payer: MEDICARE

## 2022-09-01 DIAGNOSIS — D49.3 NEOPLASM OF LEFT BREAST: ICD-10-CM

## 2022-09-01 PROCEDURE — 77066 DX MAMMO INCL CAD BI: CPT | Performed by: FAMILY MEDICINE

## 2022-09-01 PROCEDURE — 77062 BREAST TOMOSYNTHESIS BI: CPT | Performed by: FAMILY MEDICINE

## 2022-09-01 PROCEDURE — 76642 ULTRASOUND BREAST LIMITED: CPT | Performed by: FAMILY MEDICINE

## 2023-02-09 ENCOUNTER — HOSPITAL ENCOUNTER (OUTPATIENT)
Dept: ULTRASOUND IMAGING | Facility: HOSPITAL | Age: 43
Discharge: HOME | End: 2023-02-09
Attending: NURSE PRACTITIONER
Payer: MEDICARE

## 2023-02-09 ENCOUNTER — HOSPITAL ENCOUNTER (OUTPATIENT)
Dept: MAMMOGRAPHY | Facility: HOSPITAL | Age: 43
Discharge: HOME | End: 2023-02-09
Attending: NURSE PRACTITIONER
Payer: MEDICARE

## 2023-02-09 DIAGNOSIS — N64.89 OTHER SPECIFIED DISORDERS OF BREAST: ICD-10-CM

## 2023-02-09 PROCEDURE — 76642 ULTRASOUND BREAST LIMITED: CPT | Performed by: NURSE PRACTITIONER

## 2023-02-09 PROCEDURE — 77066 DX MAMMO INCL CAD BI: CPT | Performed by: NURSE PRACTITIONER

## 2023-02-09 PROCEDURE — 77062 BREAST TOMOSYNTHESIS BI: CPT | Performed by: NURSE PRACTITIONER

## 2023-02-25 ENCOUNTER — HOSPITAL ENCOUNTER (OUTPATIENT)
Dept: GENERAL RADIOLOGY | Facility: HOSPITAL | Age: 43
Discharge: HOME | End: 2023-02-25
Attending: NURSE PRACTITIONER
Payer: MEDICARE

## 2023-02-25 DIAGNOSIS — S16.2XXA: ICD-10-CM

## 2023-02-25 PROCEDURE — 72040 X-RAY EXAM NECK SPINE 2-3 VW: CPT | Performed by: NURSE PRACTITIONER

## 2023-03-28 ENCOUNTER — OFFICE VISIT (OUTPATIENT)
Dept: SURGERY | Facility: CLINIC | Age: 43
End: 2023-03-28
Payer: MEDICARE

## 2023-03-28 VITALS
SYSTOLIC BLOOD PRESSURE: 143 MMHG | TEMPERATURE: 98 F | DIASTOLIC BLOOD PRESSURE: 86 MMHG | HEART RATE: 80 BPM | BODY MASS INDEX: 41 KG/M2 | WEIGHT: 224.38 LBS | RESPIRATION RATE: 18 BRPM | OXYGEN SATURATION: 97 %

## 2023-03-28 DIAGNOSIS — N60.81 SEBACEOUS CYST OF SKIN OF BOTH BREASTS: Primary | ICD-10-CM

## 2023-03-28 DIAGNOSIS — N60.82 SEBACEOUS CYST OF SKIN OF BOTH BREASTS: Primary | ICD-10-CM

## 2023-03-28 DIAGNOSIS — R92.8 ABNORMAL MAMMOGRAM: ICD-10-CM

## 2023-03-28 PROCEDURE — 99204 OFFICE O/P NEW MOD 45 MIN: CPT | Performed by: SURGERY

## 2023-04-27 ENCOUNTER — TELEPHONE (OUTPATIENT)
Dept: OBGYN CLINIC | Facility: CLINIC | Age: 43
End: 2023-04-27

## 2023-04-27 ENCOUNTER — OFFICE VISIT (OUTPATIENT)
Dept: OBGYN CLINIC | Facility: CLINIC | Age: 43
End: 2023-04-27

## 2023-04-27 VITALS
HEIGHT: 61.7 IN | HEART RATE: 80 BPM | DIASTOLIC BLOOD PRESSURE: 89 MMHG | BODY MASS INDEX: 40.12 KG/M2 | SYSTOLIC BLOOD PRESSURE: 137 MMHG | WEIGHT: 218 LBS

## 2023-04-27 DIAGNOSIS — Z01.419 WELL WOMAN EXAM WITH ROUTINE GYNECOLOGICAL EXAM: Primary | ICD-10-CM

## 2023-07-13 ENCOUNTER — OFFICE VISIT (OUTPATIENT)
Dept: DERMATOLOGY CLINIC | Facility: CLINIC | Age: 43
End: 2023-07-13

## 2023-07-13 DIAGNOSIS — L73.2 HIDRADENITIS SUPPURATIVA: Primary | ICD-10-CM

## 2023-07-13 DIAGNOSIS — L30.4 INTERTRIGO: ICD-10-CM

## 2023-07-13 PROCEDURE — 99204 OFFICE O/P NEW MOD 45 MIN: CPT | Performed by: STUDENT IN AN ORGANIZED HEALTH CARE EDUCATION/TRAINING PROGRAM

## 2023-07-13 RX ORDER — VARENICLINE TARTRATE 1 MG/1
1 TABLET, FILM COATED ORAL 2 TIMES DAILY
COMMUNITY

## 2023-07-13 RX ORDER — KETOCONAZOLE 20 MG/G
CREAM TOPICAL
COMMUNITY
Start: 2023-03-19

## 2023-07-13 RX ORDER — CLINDAMYCIN PHOSPHATE 10 UG/ML
1 LOTION TOPICAL 2 TIMES DAILY
Qty: 60 ML | Refills: 11 | Status: SHIPPED | OUTPATIENT
Start: 2023-07-13 | End: 2023-08-12

## 2023-07-13 RX ORDER — OMEPRAZOLE 40 MG/1
CAPSULE, DELAYED RELEASE ORAL
COMMUNITY
Start: 2023-06-21

## 2023-07-13 RX ORDER — NABUMETONE 500 MG/1
TABLET, FILM COATED ORAL
COMMUNITY
Start: 2023-05-15

## 2023-07-13 RX ORDER — CLINDAMYCIN HYDROCHLORIDE 300 MG/1
CAPSULE ORAL
COMMUNITY
Start: 2023-02-10

## 2023-07-13 RX ORDER — SULFAMETHOXAZOLE AND TRIMETHOPRIM 800; 160 MG/1; MG/1
TABLET ORAL
COMMUNITY
Start: 2023-06-19

## 2023-07-13 RX ORDER — TRAMADOL HYDROCHLORIDE 50 MG/1
TABLET ORAL
COMMUNITY
Start: 2023-03-03

## 2023-07-13 RX ORDER — SYRINGE WITH NEEDLE, 1 ML 25GX5/8"
SYRINGE, EMPTY DISPOSABLE MISCELLANEOUS
COMMUNITY
Start: 2023-06-21

## 2023-07-13 RX ORDER — METAXALONE 800 MG/1
TABLET ORAL
COMMUNITY
Start: 2023-03-22

## 2023-07-13 RX ORDER — NYSTATIN AND TRIAMCINOLONE ACETONIDE 100000; 1 [USP'U]/G; MG/G
OINTMENT TOPICAL
Qty: 60 G | Refills: 1 | Status: SHIPPED | OUTPATIENT
Start: 2023-07-13

## 2023-07-13 RX ORDER — NIRMATRELVIR AND RITONAVIR 300-100 MG
KIT ORAL
COMMUNITY
Start: 2023-03-31

## 2023-07-13 NOTE — PROGRESS NOTES
New Patient    Referred by: No referring provider defined for this encounter. CHIEF COMPLAINT: Lesion of concern     HISTORY OF PRESENT ILLNESS: Deandre Ayers is a 38year old female here for evaluation of lesion of concern. 1. Growth   Location: Under Bilateral breast  Duration: 1 year  Signs and symptoms: Pain and drainage  Current treatment: None  Past treatments: Bactrim     2. Growth   Location: Under L arm pit  Duration: 2 years  Signs and symptoms: Itching   Current treatment: Monistat, Nystatin  Past treatments: Ketoconazole    Personal Dermatologic History  History of skin cancer: No  History of  atypical moles: No    FAMILY HISTORY:  History of melanoma: No    Past Medical History  Past Medical History:   Diagnosis Date    Anxiety     Asthma     When younger, exercise induced    Depression     Esophageal reflux     Essential hypertension     Fibromyalgia     Herniated cervical intervertebral disc     s/p C5/6 surgery    Herniated lumbar intervertebral disc     High blood pressure     Migraine with aura     Migraines     Sleep disorder     Tachycardia        REVIEW OF SYSTEMS:  Constitutional: Denies fever, chills, unintentional weight loss. Skin as per HPI    Medications  Current Outpatient Medications   Medication Sig Dispense Refill    BD LUER-JIMENEZ SYRINGE 23G X 1\" 3 ML Does not apply Misc       varenicline 1 MG Oral Tab Take 1 tablet (1 mg total) by mouth 2 (two) times daily. PAXLOVID, 300/100, 20 x 150 MG & 10 x 100MG Oral Tablet Therapy Pack       Omeprazole 40 MG Oral Capsule Delayed Release       gabapentin 400 MG Oral Cap       nebivolol 5 MG Oral Tab       Nebivolol HCl 20 MG Oral Tab       famotidine 20 MG Oral Tab Take 1 tablet (20 mg total) by mouth one time. hydrOXYzine 25 MG Oral Tab Take 1 tablet (25 mg total) by mouth nightly. Taking half tab (12.5mg)      Montelukast Sodium 10 MG Oral Tab Take 1 tablet (10 mg total) by mouth nightly.       Lasmiditan Succinate (REYVOW) 100 MG Oral Tab Take by mouth. Vitamin K, Phytonadione, 100 MCG Oral Tab Take by mouth. Coenzyme Q10 (COQ10) 200 MG Oral Cap Take by mouth. Benztropine Mesylate 1 MG Oral Tab Take 1 tablet (1 mg total) by mouth. Cholecalciferol (VITAMIN D3) 10 MCG (400 UNIT) Oral Cap takes 1000 units daily      Vitamin B-12 250 MCG Oral Tab Take by mouth. Galcanezumab-gnlm 120 MG/ML Subcutaneous Solution Auto-injector ADMINISTER 1 ML UNDER THE SKIN EVERY 30 DAYS      LORazepam 1 MG Oral Tab Take two tablet by mouth twice daily as needed for severe headache. Take with Haldol/Cogentin. Limit 20/month. Venlafaxine HCl  MG Oral Capsule SR 24 Hr Take 1 capsule (150 mg total) by mouth. Aspirin-Acetaminophen-Caffeine (EXCEDRIN MIGRAINE OR) Take by mouth as needed. clindamycin 300 MG Oral Cap  (Patient not taking: Reported on 7/13/2023)      ketoconazole 2 % External Cream  (Patient not taking: Reported on 7/13/2023)      nabumetone 500 MG Oral Tab  (Patient not taking: Reported on 7/13/2023)      sulfamethoxazole-trimethoprim -160 MG Oral Tab per tablet  (Patient not taking: Reported on 7/13/2023)      Metaxalone 800 MG Oral Tab  (Patient not taking: Reported on 7/13/2023)      traMADol 50 MG Oral Tab  (Patient not taking: Reported on 7/13/2023)      azelastine 0.1 % Nasal Solution 2 sprays by Nasal route 2 (two) times daily. (Patient not taking: Reported on 7/13/2023) 30 mL 3    EPINEPHrine (EPIPEN 2-FERNANDO) 0.3 MG/0.3ML Injection Solution Auto-injector Inject IM in event of  allergic reaction (Patient not taking: Reported on 7/13/2023) 1 each 0    diphenhydrAMINE HCl 50 MG/ML Injection Solution  (Patient not taking: Reported on 7/13/2023)      NYSTOP [id number] UNIT/GM External Powder  (Patient not taking: Reported on 7/13/2023)      ibuprofen 200 MG Oral Tab Take 1 tablet (200 mg total) by mouth every 6 (six) hours as needed for Pain.  (Patient not taking: Reported on 7/13/2023) PHYSICAL EXAM:  General: awake, alert, no acute distress  Neuropsych: appropriate mood and affect  Eyes: Sclerae anicteric, without conjunctival injection, eyelids unremarkable  Skin: Skin exam was performed today including the following: axillae and inframammary cleft. Pertinent findings include:   - inframammary cleft with several erythematous tender nodules  - axillae with well defined erythematous plaques     ASSESSMENT & PLAN:  Pathophysiology of diagnoses discussed with patient. Therapeutic options reviewed. Risks, benefits, and alternatives discussed with patient. Instructions reviewed at length. #Intertrigo  - Start nystatin triamcinolone twice daily  with flares  - Continue using antifungal powder once daily     #Hidradenitis suppurative  - Start clindamycin to affected area twice daily    Purchase an over the counter acne wash containing 3-6% benzoyl peroxide. (Examples: Cerave Acne Wash, Cerave Acne Foaming Cream Cleanser, PanOxyl). If you have difficulty finding one, ask your pharmacist for assistance. Use to affected area in the shower daily.  Be sure to rinse thoroughly, as medication may bleach clothing, towels and hair.  - In reserve: [address]     Return to clinic: 3 months or sooner if something concerning arises     Eric Martínez MD

## 2023-08-08 ENCOUNTER — HOSPITAL ENCOUNTER (OUTPATIENT)
Dept: ULTRASOUND IMAGING | Facility: HOSPITAL | Age: 43
Discharge: HOME | End: 2023-08-08
Payer: MEDICARE

## 2023-08-08 ENCOUNTER — HOSPITAL ENCOUNTER (OUTPATIENT)
Dept: MAMMOGRAPHY | Facility: HOSPITAL | Age: 43
Discharge: HOME | End: 2023-08-08
Payer: MEDICARE

## 2023-08-08 DIAGNOSIS — R92.8 ABNORMAL MAMMOGRAM: ICD-10-CM

## 2023-08-08 PROCEDURE — 77065 DX MAMMO INCL CAD UNI: CPT

## 2023-08-08 PROCEDURE — 76642 ULTRASOUND BREAST LIMITED: CPT

## 2023-08-08 PROCEDURE — 77061 BREAST TOMOSYNTHESIS UNI: CPT

## 2023-09-13 ENCOUNTER — OFFICE VISIT (OUTPATIENT)
Facility: CLINIC | Age: 43
End: 2023-09-13

## 2023-09-13 ENCOUNTER — TELEPHONE (OUTPATIENT)
Facility: CLINIC | Age: 43
End: 2023-09-13

## 2023-09-13 VITALS
SYSTOLIC BLOOD PRESSURE: 111 MMHG | HEART RATE: 91 BPM | DIASTOLIC BLOOD PRESSURE: 76 MMHG | BODY MASS INDEX: 41.77 KG/M2 | HEIGHT: 61.69 IN | WEIGHT: 227 LBS

## 2023-09-13 DIAGNOSIS — K58.9 IRRITABLE BOWEL SYNDROME, UNSPECIFIED TYPE: Primary | ICD-10-CM

## 2023-09-13 DIAGNOSIS — R19.7 DIARRHEA, UNSPECIFIED TYPE: Primary | ICD-10-CM

## 2023-09-13 DIAGNOSIS — Z80.0 FAMILY HISTORY OF COLON CANCER: ICD-10-CM

## 2023-09-13 DIAGNOSIS — K21.00 GASTROESOPHAGEAL REFLUX DISEASE WITH ESOPHAGITIS, UNSPECIFIED WHETHER HEMORRHAGE: ICD-10-CM

## 2023-09-13 PROCEDURE — 3078F DIAST BP <80 MM HG: CPT | Performed by: INTERNAL MEDICINE

## 2023-09-13 PROCEDURE — 3008F BODY MASS INDEX DOCD: CPT | Performed by: INTERNAL MEDICINE

## 2023-09-13 PROCEDURE — 99204 OFFICE O/P NEW MOD 45 MIN: CPT | Performed by: INTERNAL MEDICINE

## 2023-09-13 PROCEDURE — 3074F SYST BP LT 130 MM HG: CPT | Performed by: INTERNAL MEDICINE

## 2023-09-14 ENCOUNTER — APPOINTMENT (OUTPATIENT)
Dept: LAB | Facility: HOSPITAL | Age: 43
End: 2023-09-14
Attending: INTERNAL MEDICINE
Payer: MEDICARE

## 2023-09-14 PROCEDURE — 83993 ASSAY FOR CALPROTECTIN FECAL: CPT

## 2023-09-14 PROCEDURE — 87272 CRYPTOSPORIDIUM AG IF: CPT

## 2023-09-14 PROCEDURE — 87329 GIARDIA AG IA: CPT

## 2023-09-14 PROCEDURE — 87493 C DIFF AMPLIFIED PROBE: CPT

## 2023-09-15 ENCOUNTER — TELEPHONE (OUTPATIENT)
Facility: CLINIC | Age: 43
End: 2023-09-15

## 2023-09-15 ENCOUNTER — LAB ENCOUNTER (OUTPATIENT)
Dept: LAB | Facility: HOSPITAL | Age: 43
End: 2023-09-15
Attending: INTERNAL MEDICINE
Payer: MEDICARE

## 2023-09-15 DIAGNOSIS — R19.7 DIARRHEA, UNSPECIFIED TYPE: ICD-10-CM

## 2023-09-15 LAB
C DIFF TOX B STL QL: POSITIVE
CRYPTOSP AG STL QL IA: NEGATIVE
G LAMBLIA AG STL QL IA: NEGATIVE

## 2023-09-15 RX ORDER — VANCOMYCIN HYDROCHLORIDE 125 MG/1
125 CAPSULE ORAL 4 TIMES DAILY
Qty: 56 CAPSULE | Refills: 0 | Status: SHIPPED
Start: 2023-09-15 | End: 2023-09-29

## 2023-09-15 NOTE — TELEPHONE ENCOUNTER
Patient contacted, C. difficile stool testing was positive. Feel she likely has a component of underlying IBS/D but has been on and off antibiotics at least once a year over the last several years and may have selected for chronic C. difficile infection. Discussed trial of vancomycin to eradicate this and determine if her symptoms improved. Risks and benefits of medication reviewed. To call with an update after completion if she has not improved.

## 2023-09-15 NOTE — TELEPHONE ENCOUNTER
Dr. Cheri Wagner,    Call received from [facility] in reference lab. Patient's c. Diff came back positive. Please advise, thank you.

## 2023-09-16 ENCOUNTER — TELEPHONE (OUTPATIENT)
Facility: CLINIC | Age: 43
End: 2023-09-16

## 2023-09-16 NOTE — TELEPHONE ENCOUNTER
Current Outpatient Medications   Medication Sig Dispense Refill    vancomycin 125 MG Oral Cap Take 1 capsule (125 mg total) by mouth 4 (four) times daily for 14 days.  56 capsule 0       Key: [id number]

## 2023-09-18 LAB — CALPROTECTIN STL-MCNT: 38.3 ΜG/G (ref ?–50)

## 2023-09-20 NOTE — TELEPHONE ENCOUNTER
Fax received from [facility]. \"In response to you or your physician's or other prescriber's recent request, we have approved coverage of VANCOMYCIN HCL 125MG Capsule\"    The authorization is good until 12/31/2023. There is no authorization number provided just patient's member ID that we already have.     Lesley has more than 3 callers ahead of me (still after holding about 7 minutes) and right before meal break where phones at pharmacy get shut off for 30 minutes so will try to call back later today to notify of approval.

## 2023-09-20 NOTE — TELEPHONE ENCOUNTER
I called Lesley again to notify of PA. After a 21 minute hold I was told that it was filled and patient already picked it up. Encounter closed.

## 2023-09-27 ENCOUNTER — OFFICE VISIT (OUTPATIENT)
Dept: DERMATOLOGY CLINIC | Facility: CLINIC | Age: 43
End: 2023-09-27

## 2023-09-27 DIAGNOSIS — L73.2 HIDRADENITIS SUPPURATIVA: Primary | ICD-10-CM

## 2023-09-27 DIAGNOSIS — L30.4 INTERTRIGO: ICD-10-CM

## 2023-09-27 PROCEDURE — 99213 OFFICE O/P EST LOW 20 MIN: CPT | Performed by: STUDENT IN AN ORGANIZED HEALTH CARE EDUCATION/TRAINING PROGRAM

## 2023-09-27 RX ORDER — TIZANIDINE 4 MG/1
16 TABLET ORAL NIGHTLY
COMMUNITY
Start: 2023-09-27

## 2023-09-27 RX ORDER — HALOPERIDOL 5 MG/1
TABLET ORAL
COMMUNITY
Start: 2023-08-27

## 2023-09-27 RX ORDER — LEVETIRACETAM 250 MG/1
1 TABLET ORAL 2 TIMES DAILY PRN
COMMUNITY
Start: 2023-09-26

## 2023-09-27 NOTE — PROGRESS NOTES
September 27, 2023    Established Patient    Referral: [facility] Provided referral authorized by [facility] (Sent to scan)    CHIEF COMPLAINT: Lesion of concern     HISTORY OF PRESENT ILLNESS: Anastasiya Stringer is a 38year old female here for evaluation of lesion of concern. 1. Hidradenitis Suppurativa/Intertrigo   Location: Under bilateral breasts and bilateral axilla   Duration: 1 year  Signs and symptoms: Improvement in pain and drainage. No open sores currently and rash has improved. Small bumps remaining under breasts. Current treatment: Clindamycin Lotion, Hibiclens   Past treatments: Nystatin-triamcinolone cream, Monistat, Nystatin powder, Ketoconazole, Bactrim      Personal Dermatologic History  History of skin cancer: No  History of  atypical moles: No    FAMILY HISTORY:  History of melanoma: No    Past Medical History  Past Medical History:   Diagnosis Date    Anxiety     Asthma     When younger, exercise induced    Depression     Esophageal reflux     Essential hypertension     Fibromyalgia     Herniated cervical intervertebral disc     s/p C5/6 surgery    Herniated lumbar intervertebral disc     High blood pressure     Migraine with aura     Migraines     Sleep disorder     Tachycardia        REVIEW OF SYSTEMS:  Constitutional: Denies fever, chills, unintentional weight loss. Skin as per HPI    Medications  Current Outpatient Medications   Medication Sig Dispense Refill    vancomycin 125 MG Oral Cap Take 1 capsule (125 mg total) by mouth 4 (four) times daily for 14 days. 56 capsule 0    clindamycin 300 MG Oral Cap       ketoconazole 2 % External Cream       nabumetone 500 MG Oral Tab       sulfamethoxazole-trimethoprim -160 MG Oral Tab per tablet       BD LUER-JIMENEZ SYRINGE 23G X 1\" 3 ML Does not apply Misc       varenicline 1 MG Oral Tab Take 1 tablet (1 mg total) by mouth 2 (two) times daily.       Metaxalone 800 MG Oral Tab       PAXLOVID, 300/100, 20 x 150 MG & 10 x 100MG Oral Tablet Therapy Pack       Omeprazole 40 MG Oral Capsule Delayed Release       traMADol 50 MG Oral Tab       nystatin-triamcinolone 100,000-0.1 Units/g-% External Ointment Apply twice daily  Monday-Friday to affected areas of rash in armpits. Use only with flares. 60 g 1    gabapentin 400 MG Oral Cap       nebivolol 5 MG Oral Tab       Nebivolol HCl 20 MG Oral Tab       azelastine 0.1 % Nasal Solution 2 sprays by Nasal route 2 (two) times daily. 30 mL 3    famotidine 20 MG Oral Tab Take 1 tablet (20 mg total) by mouth one time. hydrOXYzine 25 MG Oral Tab Take 1 tablet (25 mg total) by mouth nightly. Taking half tab (12.5mg)      EPINEPHrine (EPIPEN 2-FERNANDO) 0.3 MG/0.3ML Injection Solution Auto-injector Inject IM in event of  allergic reaction 1 each 0    Montelukast Sodium 10 MG Oral Tab Take 1 tablet (10 mg total) by mouth nightly. Lasmiditan Succinate (REYVOW) 100 MG Oral Tab Take by mouth. Vitamin K, Phytonadione, 100 MCG Oral Tab Take by mouth. Coenzyme Q10 (COQ10) 200 MG Oral Cap Take by mouth. Benztropine Mesylate 1 MG Oral Tab Take 1 tablet (1 mg total) by mouth. Cholecalciferol (VITAMIN D3) 10 MCG (400 UNIT) Oral Cap takes 1000 units daily      Vitamin B-12 250 MCG Oral Tab Take by mouth. diphenhydrAMINE HCl 50 MG/ML Injection Solution       Galcanezumab-gnlm 120 MG/ML Subcutaneous Solution Auto-injector ADMINISTER 1 ML UNDER THE SKIN EVERY 30 DAYS      LORazepam 1 MG Oral Tab Take two tablet by mouth twice daily as needed for severe headache. Take with Haldol/Cogentin. Limit 20/month. NYSTOP [id number] UNIT/GM External Powder       Venlafaxine HCl  MG Oral Capsule SR 24 Hr Take 1 capsule (150 mg total) by mouth. Aspirin-Acetaminophen-Caffeine (EXCEDRIN MIGRAINE OR) Take by mouth as needed. ibuprofen 200 MG Oral Tab Take 1 tablet (200 mg total) by mouth every 6 (six) hours as needed for Pain.          PHYSICAL EXAM:  General: awake, alert, no acute distress  Neuropsych: appropriate mood and affect  Eyes: Sclerae anicteric, without conjunctival injection, eyelids unremarkable  Skin: Skin exam was performed today including the following: axillae and inframammary cleft. Pertinent findings include:   - axillae with well defined erythematous plaques     ASSESSMENT & PLAN:  Pathophysiology of diagnoses discussed with patient. Therapeutic options reviewed. Risks, benefits, and alternatives discussed with patient. Instructions reviewed at length.     #Intertrigo  - Continue nystatin triamcinolone twice daily  with flares  - Continue using antifungal powder once daily     #Hidradenitis suppurative  - Continue BPO wash and clindamycin to affected area twice daily    - In reserve: [address]     Return to clinic: 3 months or sooner if something concerning arises     Swati Wallace MD

## 2023-12-07 ENCOUNTER — OFFICE VISIT (OUTPATIENT)
Dept: URGENT CARE | Age: 43
End: 2023-12-07

## 2023-12-07 VITALS
OXYGEN SATURATION: 98 % | DIASTOLIC BLOOD PRESSURE: 108 MMHG | RESPIRATION RATE: 18 BRPM | SYSTOLIC BLOOD PRESSURE: 160 MMHG | HEART RATE: 110 BPM | TEMPERATURE: 98.3 F

## 2023-12-07 DIAGNOSIS — J02.0 STREP PHARYNGITIS: Primary | ICD-10-CM

## 2023-12-07 DIAGNOSIS — J02.9 SORE THROAT: ICD-10-CM

## 2023-12-07 LAB
INTERNAL PROCEDURAL CONTROLS ACCEPTABLE: YES
S PYO AG THROAT QL IA.RAPID: POSITIVE
TEST LOT EXPIRATION DATE: ABNORMAL
TEST LOT NUMBER: ABNORMAL

## 2023-12-07 PROCEDURE — 87880 STREP A ASSAY W/OPTIC: CPT | Performed by: NURSE PRACTITIONER

## 2023-12-07 PROCEDURE — 99203 OFFICE O/P NEW LOW 30 MIN: CPT | Performed by: NURSE PRACTITIONER

## 2023-12-07 RX ORDER — AMOXICILLIN 500 MG/1
500 CAPSULE ORAL 2 TIMES DAILY
Qty: 20 CAPSULE | Refills: 0 | Status: SHIPPED | OUTPATIENT
Start: 2023-12-07 | End: 2023-12-17

## 2023-12-07 ASSESSMENT — ENCOUNTER SYMPTOMS
GASTROINTESTINAL NEGATIVE: 1
FEVER: 1
VOICE CHANGE: 1
SORE THROAT: 1
APPETITE CHANGE: 1
RESPIRATORY NEGATIVE: 1

## 2024-01-03 ENCOUNTER — OFFICE VISIT (OUTPATIENT)
Dept: DERMATOLOGY CLINIC | Facility: CLINIC | Age: 44
End: 2024-01-03
Payer: MEDICARE

## 2024-01-03 DIAGNOSIS — L73.2 HIDRADENITIS SUPPURATIVA: ICD-10-CM

## 2024-01-03 DIAGNOSIS — L30.4 INTERTRIGO: Primary | ICD-10-CM

## 2024-01-03 PROCEDURE — 99213 OFFICE O/P EST LOW 20 MIN: CPT | Performed by: STUDENT IN AN ORGANIZED HEALTH CARE EDUCATION/TRAINING PROGRAM

## 2024-01-03 RX ORDER — CLINDAMYCIN PHOSPHATE 10 MG/ML
1 SOLUTION TOPICAL 2 TIMES DAILY
Qty: 60 EACH | Refills: 11 | Status: SHIPPED | OUTPATIENT
Start: 2024-01-03 | End: 2024-02-02

## 2024-01-03 NOTE — PROGRESS NOTES
January 3, 2023    Established Patient    Referral: [facility] Provided referral authorized by [employer] (Sent to scan)    CHIEF COMPLAINT: H.S.    HISTORY OF PRESENT ILLNESS: Taisha Ch is a 43 year old female here for HS F/U    1. Hidradenitis Suppurativa/Intertrigo   Location: Under bilateral breasts and bilateral axilla   Duration: 1 year  Signs and symptoms: Improvement in pain and drainage. No sores currently and rash has improved. Open \"pores\" remaining but not filling with fluid.  Current treatment: Nystatin Triamcinolone, Antifungal powder, BPO wash, Clindamycin     Personal Dermatologic History  History of skin cancer: No  History of  atypical moles: No    FAMILY HISTORY:  History of melanoma: No    Past Medical History  Past Medical History:   Diagnosis Date    Anxiety     Asthma     When younger, exercise induced    Depression     Esophageal reflux     Essential hypertension     Fibromyalgia     Herniated cervical intervertebral disc     s/p C5/6 surgery    Herniated lumbar intervertebral disc     High blood pressure     Migraine with aura     Migraines     Sleep disorder     Tachycardia        REVIEW OF SYSTEMS:  Constitutional: Denies fever, chills, unintentional weight loss.   Skin as per HPI    Medications  Current Outpatient Medications   Medication Sig Dispense Refill    tiZANidine 4 MG Oral Tab Take 4 tablets (16 mg total) by mouth nightly.      levETIRAcetam 250 MG Oral Tab Take 1 tablet (250 mg total) by mouth 2 (two) times daily as needed.      haloperidol 5 MG Oral Tab       dexamethasone 0.75 MG Oral Tab  (Patient not taking: Reported on 9/27/2023)      clindamycin 300 MG Oral Cap       ketoconazole 2 % External Cream       nabumetone 500 MG Oral Tab       sulfamethoxazole-trimethoprim -160 MG Oral Tab per tablet       BD LUER-JIMENEZ SYRINGE 23G X 1\" 3 ML Does not apply Misc       varenicline 1 MG Oral Tab Take 1 tablet (1 mg total) by mouth 2 (two) times  daily.      Metaxalone 800 MG Oral Tab       PAXLOVID, 300/100, 20 x 150 MG & 10 x 100MG Oral Tablet Therapy Pack       Omeprazole 40 MG Oral Capsule Delayed Release       traMADol 50 MG Oral Tab       nystatin-triamcinolone 100,000-0.1 Units/g-% External Ointment Apply twice daily  Monday-Friday to affected areas of rash in armpits. Use only with flares. (Patient not taking: Reported on 9/27/2023) 60 g 1    gabapentin 400 MG Oral Cap       nebivolol 5 MG Oral Tab       Nebivolol HCl 20 MG Oral Tab       azelastine 0.1 % Nasal Solution 2 sprays by Nasal route 2 (two) times daily. 30 mL 3    famotidine 20 MG Oral Tab Take 1 tablet (20 mg total) by mouth one time.      hydrOXYzine 25 MG Oral Tab Take 1 tablet (25 mg total) by mouth nightly. Taking half tab (12.5mg)      EPINEPHrine (EPIPEN 2-FERNANDO) 0.3 MG/0.3ML Injection Solution Auto-injector Inject IM in event of  allergic reaction 1 each 0    Montelukast Sodium 10 MG Oral Tab Take 1 tablet (10 mg total) by mouth nightly.      Lasmiditan Succinate (REYVOW) 100 MG Oral Tab Take by mouth.      Vitamin K, Phytonadione, 100 MCG Oral Tab Take by mouth.      Coenzyme Q10 (COQ10) 200 MG Oral Cap Take by mouth.      Benztropine Mesylate 1 MG Oral Tab Take 1 tablet (1 mg total) by mouth.      Cholecalciferol (VITAMIN D3) 10 MCG (400 UNIT) Oral Cap takes 1000 units daily      Vitamin B-12 250 MCG Oral Tab Take by mouth.      diphenhydrAMINE HCl 50 MG/ML Injection Solution       Galcanezumab-gnlm 120 MG/ML Subcutaneous Solution Auto-injector ADMINISTER 1 ML UNDER THE SKIN EVERY 30 DAYS      LORazepam 1 MG Oral Tab Take two tablet by mouth twice daily as needed for severe headache. Take with Haldol/Cogentin. Limit 20/month.      NYSTOP [id number] UNIT/GM External Powder  (Patient not taking: Reported on 9/27/2023)      Venlafaxine HCl  MG Oral Capsule SR 24 Hr Take 1 capsule (150 mg total) by mouth.      Aspirin-Acetaminophen-Caffeine (EXCEDRIN MIGRAINE OR) Take by mouth as  needed.        ibuprofen 200 MG Oral Tab Take 1 tablet (200 mg total) by mouth every 6 (six) hours as needed for Pain.         PHYSICAL EXAM:  General: awake, alert, no acute distress  Neuropsych: appropriate mood and affect  Eyes: Sclerae anicteric, without conjunctival injection, eyelids unremarkable  Skin: Skin exam was performed today including the following: axillae and inframammary cleft. Pertinent findings include:   - axillae with well defined erythematous plaques     ASSESSMENT & PLAN:  Pathophysiology of diagnoses discussed with patient.  Therapeutic options reviewed. Risks, benefits, and alternatives discussed with patient. Instructions reviewed at length.    #Intertrigo  - Continue nystatin triamcinolone twice daily  with flares  - Continue using antifungal powder once daily     #Hidradenitis suppurative  - Continue BPO wash and clindamycin to affected area twice daily      Return to clinic: 1 yearor sooner if something concerning arises     Taiwo Duckworth MD

## 2024-02-27 RX ORDER — ORPHENADRINE CITRATE 100 MG/1
TABLET, EXTENDED RELEASE ORAL
COMMUNITY
Start: 2023-11-29

## 2024-02-27 NOTE — PAT NURSING NOTE
Pt states she was sick Friday went to urgent care, with cold like s/s. Cough fever body aches runny nose but states resp panel done at urgent care was negative, no results in mychart. Pt scheduled for covid test 3 days prior.

## 2024-03-05 ENCOUNTER — LAB ENCOUNTER (OUTPATIENT)
Dept: LAB | Facility: HOSPITAL | Age: 44
End: 2024-03-05
Attending: INTERNAL MEDICINE
Payer: MEDICARE

## 2024-03-05 DIAGNOSIS — Z11.52 ENCOUNTER FOR PREOPERATIVE SCREENING LABORATORY TESTING FOR COVID-19 VIRUS: ICD-10-CM

## 2024-03-05 DIAGNOSIS — Z01.812 ENCOUNTER FOR PREOPERATIVE SCREENING LABORATORY TESTING FOR COVID-19 VIRUS: ICD-10-CM

## 2024-03-05 PROCEDURE — 87635 SARS-COV-2 COVID-19 AMP PRB: CPT

## 2024-03-06 LAB — SARS-COV-2 RNA RESP QL NAA+PROBE: NOT DETECTED

## 2024-03-08 ENCOUNTER — HOSPITAL ENCOUNTER (OUTPATIENT)
Facility: HOSPITAL | Age: 44
Setting detail: HOSPITAL OUTPATIENT SURGERY
Discharge: HOME | End: 2024-03-08
Attending: INTERNAL MEDICINE | Admitting: INTERNAL MEDICINE
Payer: MEDICARE

## 2024-03-08 ENCOUNTER — ANESTHESIA EVENT (OUTPATIENT)
Dept: ENDOSCOPY | Facility: HOSPITAL | Age: 44
End: 2024-03-08
Payer: MEDICARE

## 2024-03-08 ENCOUNTER — ANESTHESIA (OUTPATIENT)
Dept: ENDOSCOPY | Facility: HOSPITAL | Age: 44
End: 2024-03-08
Payer: MEDICARE

## 2024-03-08 VITALS
BODY MASS INDEX: 39.56 KG/M2 | HEIGHT: 62 IN | SYSTOLIC BLOOD PRESSURE: 138 MMHG | OXYGEN SATURATION: 100 % | RESPIRATION RATE: 19 BRPM | WEIGHT: 215 LBS | HEART RATE: 70 BPM | DIASTOLIC BLOOD PRESSURE: 83 MMHG

## 2024-03-08 DIAGNOSIS — K58.9 IRRITABLE BOWEL SYNDROME, UNSPECIFIED TYPE: ICD-10-CM

## 2024-03-08 DIAGNOSIS — K21.00 GASTROESOPHAGEAL REFLUX DISEASE WITH ESOPHAGITIS, UNSPECIFIED WHETHER HEMORRHAGE: ICD-10-CM

## 2024-03-08 DIAGNOSIS — Z01.812 ENCOUNTER FOR PREOPERATIVE SCREENING LABORATORY TESTING FOR COVID-19 VIRUS: Primary | ICD-10-CM

## 2024-03-08 DIAGNOSIS — Z80.0 FAMILY HISTORY OF COLON CANCER: ICD-10-CM

## 2024-03-08 DIAGNOSIS — Z11.52 ENCOUNTER FOR PREOPERATIVE SCREENING LABORATORY TESTING FOR COVID-19 VIRUS: Primary | ICD-10-CM

## 2024-03-08 LAB — B-HCG UR QL: NEGATIVE

## 2024-03-08 PROCEDURE — 45385 COLONOSCOPY W/LESION REMOVAL: CPT | Performed by: INTERNAL MEDICINE

## 2024-03-08 PROCEDURE — 45380 COLONOSCOPY AND BIOPSY: CPT | Performed by: INTERNAL MEDICINE

## 2024-03-08 PROCEDURE — 0DBM8ZX EXCISION OF DESCENDING COLON, VIA NATURAL OR ARTIFICIAL OPENING ENDOSCOPIC, DIAGNOSTIC: ICD-10-PCS | Performed by: INTERNAL MEDICINE

## 2024-03-08 PROCEDURE — 0DBN8ZX EXCISION OF SIGMOID COLON, VIA NATURAL OR ARTIFICIAL OPENING ENDOSCOPIC, DIAGNOSTIC: ICD-10-PCS | Performed by: INTERNAL MEDICINE

## 2024-03-08 PROCEDURE — 0DBK8ZX EXCISION OF ASCENDING COLON, VIA NATURAL OR ARTIFICIAL OPENING ENDOSCOPIC, DIAGNOSTIC: ICD-10-PCS | Performed by: INTERNAL MEDICINE

## 2024-03-08 RX ORDER — NALOXONE HYDROCHLORIDE 0.4 MG/ML
0.08 INJECTION, SOLUTION INTRAMUSCULAR; INTRAVENOUS; SUBCUTANEOUS ONCE AS NEEDED
Status: DISCONTINUED | OUTPATIENT
Start: 2024-03-08 | End: 2024-03-08

## 2024-03-08 RX ORDER — SODIUM CHLORIDE, SODIUM LACTATE, POTASSIUM CHLORIDE, CALCIUM CHLORIDE 600; 310; 30; 20 MG/100ML; MG/100ML; MG/100ML; MG/100ML
INJECTION, SOLUTION INTRAVENOUS CONTINUOUS
Status: DISCONTINUED | OUTPATIENT
Start: 2024-03-08 | End: 2024-03-08

## 2024-03-08 RX ORDER — LIDOCAINE HYDROCHLORIDE 10 MG/ML
INJECTION, SOLUTION EPIDURAL; INFILTRATION; INTRACAUDAL; PERINEURAL AS NEEDED
Status: DISCONTINUED | OUTPATIENT
Start: 2024-03-08 | End: 2024-03-08 | Stop reason: SURG

## 2024-03-08 RX ADMIN — SODIUM CHLORIDE, SODIUM LACTATE, POTASSIUM CHLORIDE, CALCIUM CHLORIDE: 600; 310; 30; 20 INJECTION, SOLUTION INTRAVENOUS at 14:39:00

## 2024-03-08 RX ADMIN — LIDOCAINE HYDROCHLORIDE 40 MG: 10 INJECTION, SOLUTION EPIDURAL; INFILTRATION; INTRACAUDAL; PERINEURAL at 14:15:00

## 2024-03-08 NOTE — DISCHARGE INSTRUCTIONS
Home Care Instructions for Colonoscopy  Diet:  - Resume your regular diet as tolerated unless otherwise instructed.  - Start with light meals to minimize bloating.  - Do not drink alcohol today.    Medication:  - If you have questions about resuming your normal medications, please contact your Primary Care Physician.    Activities:  - Take it easy today. Do not return to work today.  - Do not drive today.  - Do not operate any machinery today (including kitchen equipment).    Colonoscopy:  - You may notice some rectal \"spotting\" (a little blood on the toilet tissue) for a day or two after the exam. This is normal.  - If you experience any rectal bleeding (not spotting), persistent tenderness or sharp severe abdominal pains, oral temperature over 100 degrees Fahrenheit, light-headedness or dizziness, or any other problems, contact your doctor.      **If unable to reach your doctor, please go to the [facility] Emergency Room**    - Your referring physician will receive a full report of your examination.  - If you do not hear from your doctor's office within two weeks of your biopsy, please call them for your results.    You may be able to see your laboratory results in [facility] between 4 and 7 business days.  In some cases, your physician may not have viewed the results before they are released to [facility].  If you have questions regarding your results contact the physician who ordered the test/exam by phone or via [facility] by choosing \"Ask a Medical Question.\"

## 2024-03-08 NOTE — H&P
History & Physical Examination    Patient Name: Taisha Ch  MRN: [id number]  CSN: [id number]  YOB: 1980    Diagnosis:   CRC screening  Family history colon cancer      Medications Prior to Admission   Medication Sig Dispense Refill Last Dose    orphenadrine  MG Oral Tablet 12 Hr Take 1 tablet twice a day as needed for headache.       tiZANidine 4 MG Oral Tab Take 4 tablets (16 mg total) by mouth nightly.   3/7/2024    levETIRAcetam 250 MG Oral Tab Take 1 tablet (250 mg total) by mouth 2 (two) times daily as needed.   week    haloperidol 5 MG Oral Tab    3/7/2024    nabumetone 500 MG Oral Tab        Metaxalone 800 MG Oral Tab        Omeprazole 40 MG Oral Capsule Delayed Release        traMADol 50 MG Oral Tab        gabapentin 400 MG Oral Cap    3/7/2024    nebivolol 5 MG Oral Tab        Nebivolol HCl 20 MG Oral Tab        hydrOXYzine 25 MG Oral Tab Take 1 tablet (25 mg total) by mouth nightly. Taking half tab (12.5mg)   3/7/2024    Montelukast Sodium 10 MG Oral Tab Take 1 tablet (10 mg total) by mouth nightly.   3/7/2024    Vitamin K, Phytonadione, 100 MCG Oral Tab Take by mouth.       Coenzyme Q10 (COQ10) 200 MG Oral Cap Take by mouth.       Benztropine Mesylate 1 MG Oral Tab Take 1 tablet (1 mg total) by mouth.       Cholecalciferol (VITAMIN D3) 10 MCG (400 UNIT) Oral Cap takes 1000 units daily       Vitamin B-12 250 MCG Oral Tab Take by mouth.       diphenhydrAMINE HCl 50 MG/ML Injection Solution    3/7/2024    Galcanezumab-gnlm 120 MG/ML Subcutaneous Solution Auto-injector ADMINISTER 1 ML UNDER THE SKIN EVERY 30 DAYS       LORazepam 1 MG Oral Tab Take two tablet by mouth twice daily as needed for severe headache. Take with Haldol/Cogentin. Limit 20/month.   week    Venlafaxine HCl  MG Oral Capsule SR 24 Hr Take 1 capsule (150 mg total) by mouth.   3/7/2024    Aspirin-Acetaminophen-Caffeine (EXCEDRIN MIGRAINE OR) Take by mouth as needed.         [] Clindamycin Phosphate 1  % External Swab Apply 1 Application topically 2 (two) times daily for 60 doses. Apply twice daily to affected areas 60 each 11     dexamethasone 0.75 MG Oral Tab        clindamycin 300 MG Oral Cap        ketoconazole 2 % External Cream  (Patient not taking: Reported on 1/3/2024)       sulfamethoxazole-trimethoprim -160 MG Oral Tab per tablet        BD LUER-JIMENEZ SYRINGE 23G X 1\" 3 ML Does not apply Misc        varenicline 1 MG Oral Tab Take 1 tablet (1 mg total) by mouth 2 (two) times daily.       PAXLOVID, 300/100, 20 x 150 MG & 10 x 100MG Oral Tablet Therapy Pack  (Patient not taking: Reported on 1/3/2024)       nystatin-triamcinolone 100,000-0.1 Units/g-% External Ointment Apply twice daily  Monday-Friday to affected areas of rash in armpits. Use only with flares. 60 g 1     azelastine 0.1 % Nasal Solution 2 sprays by Nasal route 2 (two) times daily. 30 mL 3     famotidine 20 MG Oral Tab Take 1 tablet (20 mg total) by mouth one time.       EPINEPHrine (EPIPEN 2-FERNANDO) 0.3 MG/0.3ML Injection Solution Auto-injector Inject IM in event of  allergic reaction (Patient not taking: Reported on 1/3/2024) 1 each 0     Lasmiditan Succinate (REYVOW) 100 MG Oral Tab Take by mouth.       NYSTOP [id number] UNIT/GM External Powder        ibuprofen 200 MG Oral Tab Take 1 tablet (200 mg total) by mouth every 6 (six) hours as needed for Pain.        Current Facility-Administered Medications   Medication Dose Route Frequency    lactated ringers infusion   Intravenous Continuous       Allergies:   Allergies   Allergen Reactions    Ergotamine-Pb-Belladonna HIVES     Allergic to Bellergan    Fremanezumab-Vfrm SWELLING    Shellfish-Derived Products ANAPHYLAXIS    Coconut Fatty Acids RASH     Rash if eats coconut,cocunut oil is ok    Mold     Pollen      Sinus congestion    Seafood HIVES    Belladonna RASH     Bellergal reaction generalized rash    Coconut Flavor RASH    Fish Oil RASH       Past Medical History:   Diagnosis Date    Anxiety      Asthma (HCC)     When younger, exercise induced    Depression     Esophageal reflux     Essential hypertension     Fibromyalgia     Herniated cervical intervertebral disc     s/p C5/6 surgery    Herniated lumbar intervertebral disc     High blood pressure     Migraine with aura     Migraines     Sleep apnea     Sleep disorder     Sleep disorder 06/15/2016    Tachycardia      Past Surgical History:   Procedure Laterality Date    COLONOSCOPY      KNEE SURGERY Bilateral [id number]    Bilateral knee arthroscopy    LEEP      SPINE SURGERY PROCEDURE UNLISTED  Oct. 2015    C5-6 anterior discectomy and fusion.     Family History   Problem Relation Age of Onset    Cancer Mother     Colon Cancer Mother     Other (Gallbladder Surgery) Father     Breast Cancer Paternal Aunt 45        reoccur. 55     Social History     Tobacco Use    Smoking status: Smoker, Current Status Unknown     Types: Cigarettes     Last attempt to quit: 2016     Years since quittin.7    Smokeless tobacco: Never    Tobacco comments:     5-6 cigarettes/ day   Substance Use Topics    Alcohol use: No     Alcohol/week: 0.0 standard drinks of alcohol       SYSTEM Check if Review is Normal Check if Physical Exam is Normal If not normal, please explain:   HEENT [x ] [ x]    NECK & BACK [x ] [x ]    HEART [x ] [ x]    LUNGS [x ] [ x]    ABDOMEN [x ] [x ]    UROGENITAL [ ] [ ]    EXTREMITIES [x ] [x ]    OTHER        [ x ] I have discussed the risks and benefits and alternatives with the patient/family.  They understand and agree to proceed with plan of care.  [ x ] I have reviewed the History and Physical done within the last 30 days.  Any changes noted above.    Mark Lindo MD  3/8/2024  1:40 PM

## 2024-03-08 NOTE — OPERATIVE REPORT
[facility] Endoscopy Report  Date of procedure-March 8, 2024    Preoperative Diagnosis:  -Colorectal cancer screening  -Irregular bowel habits      Postoperative Diagnosis:  -Colon polyps x 5  -Internal hemorrhoids      Procedure:    Colonoscopy       Surgeon:  Mark Lindo M.D.    Anesthesia:  MAC    Technique:  After informed consent, the patient was placed in the left lateral recumbent position.  Digital rectal examination revealed no palpable intraluminal abnormalities.  An Olympus variable stiffness 190 series HD colonoscope was inserted into the rectum and advanced under direct vision by following the lumen to the cecum.  The colon was examined upon withdrawal in the left lateral position.    The procedure was well tolerated without immediate complication.      Findings:  The preparation of the colon was good.  The terminal ileum was examined for 4 cm and visually normal.  The ileocecal valve was well preserved. The visualized colonic mucosa from the cecum to the anal verge was normal with an intact vascular pattern.    Colon polyps x 5 removed as follows;  -Ascending x 1, sessile 4 mm in size and cold snare removed.  -Descending x 2, both polyps were diminutive and removed by cold forceps technique.  -Sigmoid x 2, both polyps were sessile 3 to 4 mm in size and cold snare removed.  All polypectomy sites were inspected and found to be free of bleeding specimens retrieved and sent for analysis.    Overall normal-appearing colonic mucosa, random biopsies taken.    Small internal hemorrhoids noted on retroflexed view.    Estimated blood loss-insignificant  Specimens-see above    Impression:  -Colon polyps x 5  -Internal hemorrhoids    Recommendations:  - Post polypectomy instructions given  - Repeat colonoscopy in 3- 10 years  - High fiber diet   - Symptomatic treatment of hemorrhoids  - Follow up on biopsies          Mark Lindo MD  3/8/2024  2:40 PM

## 2024-03-08 NOTE — ANESTHESIA PREPROCEDURE EVALUATION
Anesthesia PreOp Note    HPI:     Taisha Ch is a 43 year old female who presents for preoperative consultation requested by: Mark Lindo MD    Date of Surgery: 3/8/2024    Procedure(s):  COLONOSCOPY  Indication: Irritable bowel syndrome, unspecified type/ Gastroesophageal reflux disease with esophagitis, unspecified whether hemorrhage/ Family history of colon cancer    Relevant Problems   No relevant active problems       NPO:                         History Review:  Patient Active Problem List    Diagnosis Date Noted    Sebaceous cyst of skin of both breasts 03/28/2023    Variants of migraine, not elsewhere classified, with intractable migraine, so stated, with status migrainosus 06/23/2016    Depression 06/15/2016    Anxiety 06/15/2016    Sleep disorder 06/15/2016    H/O knee surgery 06/15/2016    H/O LEEP 06/15/2016    H/O cervical spine surgery 06/15/2016    DJD (degenerative joint disease) of cervical spine 06/15/2016    Degenerative joint disease (DJD) of lumbar spine 06/15/2016       Past Medical History:   Diagnosis Date    Anxiety     Asthma (HCC)     When younger, exercise induced    Depression     Esophageal reflux     Essential hypertension     Fibromyalgia     Herniated cervical intervertebral disc     s/p C5/6 surgery    Herniated lumbar intervertebral disc     High blood pressure     Migraine with aura     Migraines     Sleep apnea     Sleep disorder     Sleep disorder 06/15/2016    Tachycardia        Past Surgical History:   Procedure Laterality Date    COLONOSCOPY      KNEE SURGERY Bilateral [id number]    Bilateral knee arthroscopy    LEEP      SPINE SURGERY PROCEDURE UNLISTED  Oct. 2015    C5-6 anterior discectomy and fusion.       Medications Prior to Admission   Medication Sig Dispense Refill Last Dose    orphenadrine  MG Oral Tablet 12 Hr Take 1 tablet twice a day as needed for headache.       tiZANidine 4 MG Oral Tab Take 4 tablets (16 mg total) by mouth nightly.        levETIRAcetam 250 MG Oral Tab Take 1 tablet (250 mg total) by mouth 2 (two) times daily as needed.       haloperidol 5 MG Oral Tab        nabumetone 500 MG Oral Tab        Metaxalone 800 MG Oral Tab        Omeprazole 40 MG Oral Capsule Delayed Release        traMADol 50 MG Oral Tab        gabapentin 400 MG Oral Cap        nebivolol 5 MG Oral Tab        Nebivolol HCl 20 MG Oral Tab        hydrOXYzine 25 MG Oral Tab Take 1 tablet (25 mg total) by mouth nightly. Taking half tab (12.5mg)       Montelukast Sodium 10 MG Oral Tab Take 1 tablet (10 mg total) by mouth nightly.       Vitamin K, Phytonadione, 100 MCG Oral Tab Take by mouth.       Coenzyme Q10 (COQ10) 200 MG Oral Cap Take by mouth.       Benztropine Mesylate 1 MG Oral Tab Take 1 tablet (1 mg total) by mouth.       Cholecalciferol (VITAMIN D3) 10 MCG (400 UNIT) Oral Cap takes 1000 units daily       Vitamin B-12 250 MCG Oral Tab Take by mouth.       diphenhydrAMINE HCl 50 MG/ML Injection Solution        Galcanezumab-gnlm 120 MG/ML Subcutaneous Solution Auto-injector ADMINISTER 1 ML UNDER THE SKIN EVERY 30 DAYS       LORazepam 1 MG Oral Tab Take two tablet by mouth twice daily as needed for severe headache. Take with Haldol/Cogentin. Limit 20/month.       Venlafaxine HCl  MG Oral Capsule SR 24 Hr Take 1 capsule (150 mg total) by mouth.       Aspirin-Acetaminophen-Caffeine (EXCEDRIN MIGRAINE OR) Take by mouth as needed.         [] Clindamycin Phosphate 1 % External Swab Apply 1 Application topically 2 (two) times daily for 60 doses. Apply twice daily to affected areas 60 each 11     dexamethasone 0.75 MG Oral Tab        clindamycin 300 MG Oral Cap        ketoconazole 2 % External Cream  (Patient not taking: Reported on 1/3/2024)       sulfamethoxazole-trimethoprim -160 MG Oral Tab per tablet        BD LUER-JIMENEZ SYRINGE 23G X 1\" 3 ML Does not apply Misc        varenicline 1 MG Oral Tab Take 1 tablet (1 mg total) by mouth 2 (two) times daily.        PAXLOVID, 300/100, 20 x 150 MG & 10 x 100MG Oral Tablet Therapy Pack  (Patient not taking: Reported on 1/3/2024)       nystatin-triamcinolone 100,000-0.1 Units/g-% External Ointment Apply twice daily  Monday-Friday to affected areas of rash in armpits. Use only with flares. 60 g 1     azelastine 0.1 % Nasal Solution 2 sprays by Nasal route 2 (two) times daily. 30 mL 3     famotidine 20 MG Oral Tab Take 1 tablet (20 mg total) by mouth one time.       EPINEPHrine (EPIPEN 2-FERNANDO) 0.3 MG/0.3ML Injection Solution Auto-injector Inject IM in event of  allergic reaction (Patient not taking: Reported on 1/3/2024) 1 each 0     Lasmiditan Succinate (REYVOW) 100 MG Oral Tab Take by mouth.       NYSTOP [id number] UNIT/GM External Powder        ibuprofen 200 MG Oral Tab Take 1 tablet (200 mg total) by mouth every 6 (six) hours as needed for Pain.        Current Facility-Administered Medications Ordered in Epic   Medication Dose Route Frequency Provider Last Rate Last Admin    lactated ringers infusion   Intravenous Continuous Mark Lindo MD         No current [facility]-ordered outpatient medications on file.       Allergies   Allergen Reactions    Ergotamine-Pb-Belladonna HIVES     Allergic to Bellergan    Fremanezumab-Vfrm SWELLING    Shellfish-Derived Products ANAPHYLAXIS    Coconut Fatty Acids RASH     Rash if eats coconut,cocunut oil is ok    Mold     Pollen      Sinus congestion    Seafood HIVES    Belladonna RASH     Bellergal reaction generalized rash    Coconut Flavor RASH    Fish Oil RASH       Family History   Problem Relation Age of Onset    Cancer Mother     Colon Cancer Mother     Other (Gallbladder Surgery) Father     Breast Cancer Paternal Aunt 45        reoccur. 55     Social History     Socioeconomic History    Marital status:    Tobacco Use    Smoking status: Smoker, Current Status Unknown     Types: Cigarettes     Last attempt to quit: 2016     Years since quittin.7    Smokeless tobacco: Never     Tobacco comments:     5-6 cigarettes/ day   Vaping Use    Vaping Use: Never used   Substance and Sexual Activity    Alcohol use: No     Alcohol/week: 0.0 standard drinks of alcohol    Drug use: No   Other Topics Concern    Caffeine Concern Yes     Comment: 1 daily    Pt has a pacemaker No    Pt has a defibrillator No       Available pre-op labs reviewed.             Vital Signs:  Body mass index is 39.32 kg/m².   height is 1.575 m (5' 2\") and weight is 97.5 kg (215 lb).   Vitals:    02/27/24 1627   Weight: 97.5 kg (215 lb)   Height: 1.575 m (5' 2\")        Anesthesia Evaluation     Patient summary reviewed and Nursing notes reviewed    Airway   Mallampati: III  TM distance: >3 FB  Neck ROM: full  Dental          Pulmonary - normal exam   (+) asthma, sleep apnea    ROS comment: Asthma- as a child, not as an adult  Cardiovascular - normal exam  (+) hypertension well controlled    Neuro/Psych    (+)  neuromuscular disease, anxiety/panic attacks,  depression      GI/Hepatic/Renal    (+) GERD well controlled    Endo/Other - negative ROS   Abdominal   (+) obese                 Anesthesia Plan:   ASA:  2  Plan:   MAC      I have informed Taisha Ch and/or legal guardian or family member of the nature of the anesthetic plan, benefits, risks including possible dental damage if relevant, major complications, and any alternative forms of anesthetic management.   All of the patient's questions were answered to the best of my ability. The patient desires the anesthetic management as planned.  Nelda Shultz CRNA  3/8/2024 1:22 PM  Present on Admission:  **None**

## 2024-03-08 NOTE — ANESTHESIA POSTPROCEDURE EVALUATION
Patient: Taisha Ch    Procedure Summary       Date: 03/08/24 Room / Location: [facility] ENDOSCOPY 01 / [facility] ENDOSCOPY    Anesthesia Start: 1412 Anesthesia Stop:     Procedure: COLONOSCOPY with polypectomy Diagnosis:       Irritable bowel syndrome, unspecified type      Gastroesophageal reflux disease with esophagitis, unspecified whether hemorrhage      Family history of colon cancer      (polyp)    Surgeons: Mark Lindo MD Anesthesiologist: Nelda Shultz CRNA    Anesthesia Type: MAC ASA Status: 2            Anesthesia Type: MAC    Vitals Value Taken Time   /64 03/08/24 1439   Temp 97 03/08/24 1439   Pulse 77 03/08/24 1439   Resp 16 03/08/24 1439   SpO2 98 03/08/24 1439       [facility] AN Post Evaluation:   Patient Evaluated in PACU  Patient Participation: complete - patient participated  Level of Consciousness: awake  Pain Management: adequate  Airway Patency:patent  Dental exam unchanged from preop  Yes    Nausea/Vomiting: none  Cardiovascular Status: acceptable  Respiratory Status: acceptable  Postoperative Hydration acceptable      Nelda Shultz CRNA  3/8/2024 2:39 PM

## 2024-03-11 ENCOUNTER — TELEPHONE (OUTPATIENT)
Facility: CLINIC | Age: 44
End: 2024-03-11

## 2024-03-11 NOTE — TELEPHONE ENCOUNTER
Health Maintenance Updated.    3 year colonoscopy recall entered into patient outreach in [facility].  Next colonoscopy will be due 3/8/2027.    Patient viewed below result note in MyChart:  Seen by patient Taisha Ch on 3/11/2024  3:41 PM

## 2024-03-11 NOTE — TELEPHONE ENCOUNTER
----- Message from Mark Lindo MD sent at 3/11/2024  3:16 PM CDT -----  I wanted to get back to you with your colonoscopy results.  You had 5 colon polyps removed which were benign.  I would advise a repeat colonoscopy in 3 years to make sure no new polyps are forming.      Samples taken from the colon were negative for inflammation ( no Colitis).      You also have internal hemorrhoids.  Please call with any questions.     Recheck stools for C dif if ongoing diarrhea, stool test ordered.

## 2024-03-26 ENCOUNTER — HOSPITAL ENCOUNTER (OUTPATIENT)
Dept: ULTRASOUND IMAGING | Facility: HOSPITAL | Age: 44
Discharge: HOME | End: 2024-03-26
Attending: NURSE PRACTITIONER
Payer: MEDICARE

## 2024-03-26 ENCOUNTER — HOSPITAL ENCOUNTER (OUTPATIENT)
Dept: MAMMOGRAPHY | Facility: HOSPITAL | Age: 44
Discharge: HOME | End: 2024-03-26
Attending: NURSE PRACTITIONER
Payer: MEDICARE

## 2024-03-26 DIAGNOSIS — N64.89 OTHER SPECIFIED DISORDERS OF BREAST: ICD-10-CM

## 2024-03-26 PROCEDURE — 77066 DX MAMMO INCL CAD BI: CPT | Performed by: NURSE PRACTITIONER

## 2024-03-26 PROCEDURE — 76642 ULTRASOUND BREAST LIMITED: CPT | Performed by: NURSE PRACTITIONER

## 2024-03-26 PROCEDURE — 77062 BREAST TOMOSYNTHESIS BI: CPT | Performed by: NURSE PRACTITIONER

## 2024-03-27 ENCOUNTER — PATIENT MESSAGE (OUTPATIENT)
Facility: CLINIC | Age: 44
End: 2024-03-27

## 2024-03-27 NOTE — TELEPHONE ENCOUNTER
From: Taisha Ch  To: Mark Lindo  Sent: 3/27/2024 11:35 AM CDT  Subject: C Diff Test    Do I need to make an appointment to get the supplies for the C Diff test? We discussed doing a recheck at my colonoscopy. I just wondered how I should go about getting the supplies for the sample for the test.     Thank you,   Taisha

## 2024-04-03 ENCOUNTER — APPOINTMENT (OUTPATIENT)
Dept: LAB | Facility: HOSPITAL | Age: 44
End: 2024-04-03
Attending: INTERNAL MEDICINE
Payer: MEDICARE

## 2024-04-03 PROCEDURE — 87493 C DIFF AMPLIFIED PROBE: CPT

## 2024-04-04 ENCOUNTER — LAB ENCOUNTER (OUTPATIENT)
Dept: LAB | Facility: HOSPITAL | Age: 44
End: 2024-04-04
Attending: INTERNAL MEDICINE
Payer: MEDICARE

## 2024-04-04 DIAGNOSIS — R19.7 DIARRHEA, UNSPECIFIED TYPE: ICD-10-CM

## 2024-04-05 LAB — C DIFF TOX B STL QL: NEGATIVE

## 2024-04-09 ENCOUNTER — TELEPHONE (OUTPATIENT)
Facility: CLINIC | Age: 44
End: 2024-04-09

## 2024-04-09 NOTE — TELEPHONE ENCOUNTER
Patient contacted, verified and results from Dr. Lindo given.  Patient states she continues to have 2 to 3 liquid stools daily.   Alternates between Kaopectate and Imodium with little relief.  Patient asking for next step.  Please advise. Thank you.

## 2024-04-09 NOTE — TELEPHONE ENCOUNTER
She has IBS - D   - dietary changes   - avoid dairy   - FODMAP   - antidiarrheals as needed.     Follow up in office in the next 3- 6 months    RN to review above with the patient

## 2024-04-09 NOTE — TELEPHONE ENCOUNTER
Patient contacted, verified and message from Dr. Lindo given.  Appointment made for 2024 at 9:00 am with Dr. Lindo at [facility].  Patient advised to arrive 15 minutes prior to appointment and address given.  Patient voiced understanding.

## 2024-06-20 ENCOUNTER — OFFICE VISIT (OUTPATIENT)
Dept: DERMATOLOGY CLINIC | Facility: CLINIC | Age: 44
End: 2024-06-20

## 2024-06-20 DIAGNOSIS — L82.1 SEBORRHEIC KERATOSES: ICD-10-CM

## 2024-06-20 DIAGNOSIS — D22.9 BENIGN MOLE: Primary | ICD-10-CM

## 2024-06-20 PROCEDURE — 99213 OFFICE O/P EST LOW 20 MIN: CPT | Performed by: STUDENT IN AN ORGANIZED HEALTH CARE EDUCATION/TRAINING PROGRAM

## 2024-06-20 RX ORDER — PAROXETINE 10 MG/1
TABLET, FILM COATED ORAL
COMMUNITY
Start: 2024-05-30

## 2024-06-20 RX ORDER — CLINDAMYCIN PHOSPHATE 10 MG/ML
SOLUTION TOPICAL
COMMUNITY
Start: 2024-02-13

## 2024-06-20 NOTE — PROGRESS NOTES
Established Patient    Referred by: Dafne Kelly MD    CHIEF COMPLAINT: Lesion of concern     HISTORY OF PRESENT ILLNESS: Taisha Ch is a 43 year old female here for evaluation of lesion of concern.    1. Growth   Location: Right [place] (subcutaneous tissue)  Duration: Unknown   Signs and symptoms: Recommended to come in after superficial skin finding during MRI- Pt denies S/S  Current treatment: None   Past treatments: None     Personal Dermatologic History  History of skin cancer: No  History of  atypical moles: Yes    FAMILY HISTORY:  History of melanoma: Yes (Maternal Grandfather)     Past Medical History  Past Medical History:    Anxiety    Asthma (HCC)    When younger, exercise induced    Depression    Esophageal reflux    Essential hypertension    Fibromyalgia    Herniated cervical intervertebral disc    s/p C5/6 surgery    Herniated lumbar intervertebral disc    High blood pressure    Migraine with aura    Migraines    Sleep apnea    Sleep disorder    Sleep disorder    Tachycardia       REVIEW OF SYSTEMS:  Constitutional: Denies fever, chills, unintentional weight loss.   Skin as per HPI    Medications  Current Outpatient Medications   Medication Sig Dispense Refill    orphenadrine  MG Oral Tablet 12 Hr Take 1 tablet twice a day as needed for headache.      tiZANidine 4 MG Oral Tab Take 4 tablets (16 mg total) by mouth nightly.      levETIRAcetam 250 MG Oral Tab Take 1 tablet (250 mg total) by mouth 2 (two) times daily as needed.      haloperidol 5 MG Oral Tab       dexamethasone 0.75 MG Oral Tab       clindamycin 300 MG Oral Cap       ketoconazole 2 % External Cream  (Patient not taking: Reported on 1/3/2024)      nabumetone 500 MG Oral Tab       sulfamethoxazole-trimethoprim -160 MG Oral Tab per tablet       BD LUER-JIMENEZ SYRINGE 23G X 1\" 3 ML Does not apply Misc       varenicline 1 MG Oral Tab Take 1 tablet (1 mg total) by mouth 2 (two) times daily.      Metaxalone 800 MG  Oral Tab       PAXLOVID, 300/100, 20 x 150 MG & 10 x 100MG Oral Tablet Therapy Pack  (Patient not taking: Reported on 1/3/2024)      Omeprazole 40 MG Oral Capsule Delayed Release       traMADol 50 MG Oral Tab       nystatin-triamcinolone 100,000-0.1 Units/g-% External Ointment Apply twice daily  Monday-Friday to affected areas of rash in armpits. Use only with flares. 60 g 1    gabapentin 400 MG Oral Cap       nebivolol 5 MG Oral Tab       Nebivolol HCl 20 MG Oral Tab       azelastine 0.1 % Nasal Solution 2 sprays by Nasal route 2 (two) times daily. 30 mL 3    famotidine 20 MG Oral Tab Take 1 tablet (20 mg total) by mouth one time.      hydrOXYzine 25 MG Oral Tab Take 1 tablet (25 mg total) by mouth nightly. Taking half tab (12.5mg)      EPINEPHrine (EPIPEN 2-FERNANDO) 0.3 MG/0.3ML Injection Solution Auto-injector Inject IM in event of  allergic reaction (Patient not taking: Reported on 1/3/2024) 1 each 0    Montelukast Sodium 10 MG Oral Tab Take 1 tablet (10 mg total) by mouth nightly.      Lasmiditan Succinate (REYVOW) 100 MG Oral Tab Take by mouth.      Vitamin K, Phytonadione, 100 MCG Oral Tab Take by mouth.      Coenzyme Q10 (COQ10) 200 MG Oral Cap Take by mouth.      Benztropine Mesylate 1 MG Oral Tab Take 1 tablet (1 mg total) by mouth.      Cholecalciferol (VITAMIN D3) 10 MCG (400 UNIT) Oral Cap takes 1000 units daily      Vitamin B-12 250 MCG Oral Tab Take by mouth.      diphenhydrAMINE HCl 50 MG/ML Injection Solution       Galcanezumab-gnlm 120 MG/ML Subcutaneous Solution Auto-injector ADMINISTER 1 ML UNDER THE SKIN EVERY 30 DAYS      LORazepam 1 MG Oral Tab Take two tablet by mouth twice daily as needed for severe headache. Take with Haldol/Cogentin. Limit 20/month.      NYSTOP [id number] UNIT/GM External Powder       Venlafaxine HCl  MG Oral Capsule SR 24 Hr Take 1 capsule (150 mg total) by mouth.      Aspirin-Acetaminophen-Caffeine (EXCEDRIN MIGRAINE OR) Take by mouth as needed.        ibuprofen 200 MG  Oral Tab Take 1 tablet (200 mg total) by mouth every 6 (six) hours as needed for Pain.         PHYSICAL EXAM:  General: awake, alert, no acute distress  Neuropsych: appropriate mood and affect  Eyes: Sclerae anicteric, without conjunctival injection, eyelids unremarkable  Skin: Skin exam was performed today including the following: R cheek. Pertinent findings include:   - with several brown stuck on papules  - with a regular appearing brown papule    ASSESSMENT & PLAN:  Pathophysiology of diagnoses discussed with patient.  Therapeutic options reviewed. Risks, benefits, and alternatives discussed with patient. Instructions reviewed at length.    #Benign nevus   - Reassured regarding benign nature. No treatment necessary unless symptomatic/changing.   - Recommend sun protection with SPF 30 or higher, sun protective clothing such as wide brimmed hats and long sleeves. Recommend avoiding midday sun (10 am- 3 pm).    #Seborrheic Keratoses  - Reassured patient regarding benign nature of lesions. No treatment but observation at this time. Follow-up for concerning physical changes or new symptoms.    Return to clinic: as needed or sooner if something concerning arises     Taiwo Duckworth MD

## 2024-08-14 ENCOUNTER — TELEPHONE (OUTPATIENT)
Dept: RHEUMATOLOGY | Facility: CLINIC | Age: 44
End: 2024-08-14

## 2024-08-14 ENCOUNTER — TELEPHONE (OUTPATIENT)
Facility: CLINIC | Age: 44
End: 2024-08-14

## 2024-08-14 ENCOUNTER — OFFICE VISIT (OUTPATIENT)
Facility: CLINIC | Age: 44
End: 2024-08-14

## 2024-08-14 VITALS
HEART RATE: 86 BPM | DIASTOLIC BLOOD PRESSURE: 83 MMHG | SYSTOLIC BLOOD PRESSURE: 118 MMHG | BODY MASS INDEX: 41.22 KG/M2 | HEIGHT: 62 IN | WEIGHT: 224 LBS

## 2024-08-14 DIAGNOSIS — K21.9 GASTROESOPHAGEAL REFLUX DISEASE, UNSPECIFIED WHETHER ESOPHAGITIS PRESENT: Primary | ICD-10-CM

## 2024-08-14 DIAGNOSIS — K44.9 HIATAL HERNIA: ICD-10-CM

## 2024-08-14 DIAGNOSIS — K21.00 GASTROESOPHAGEAL REFLUX DISEASE WITH ESOPHAGITIS, UNSPECIFIED WHETHER HEMORRHAGE: Primary | ICD-10-CM

## 2024-08-14 DIAGNOSIS — K58.9 IRRITABLE BOWEL SYNDROME, UNSPECIFIED TYPE: ICD-10-CM

## 2024-08-14 PROBLEM — E66.01 SEVERE OBESITY (BMI >= 40) (HCC): Chronic | Status: ACTIVE | Noted: 2024-08-14

## 2024-08-14 PROCEDURE — 3008F BODY MASS INDEX DOCD: CPT | Performed by: INTERNAL MEDICINE

## 2024-08-14 PROCEDURE — 99214 OFFICE O/P EST MOD 30 MIN: CPT | Performed by: INTERNAL MEDICINE

## 2024-08-14 PROCEDURE — 3074F SYST BP LT 130 MM HG: CPT | Performed by: INTERNAL MEDICINE

## 2024-08-14 PROCEDURE — 3079F DIAST BP 80-89 MM HG: CPT | Performed by: INTERNAL MEDICINE

## 2024-08-14 RX ORDER — OMEPRAZOLE 40 MG/1
40 CAPSULE, DELAYED RELEASE ORAL DAILY
Qty: 30 CAPSULE | Refills: 11 | Status: SHIPPED | OUTPATIENT
Start: 2024-08-14

## 2024-08-14 RX ORDER — ARMODAFINIL 250 MG/1
TABLET ORAL DAILY
COMMUNITY
Start: 2024-07-10

## 2024-08-14 NOTE — PROGRESS NOTES
Taisha Ch is a 43 year old female.    HPI:     Chief Complaint   Patient presents with    Follow - Up     Colonoscopy 3/2024     The patient is a 43-year-old female who has a history of anxiety, asthma, depression, reflux, fibromyalgia, herniated cervical disc, migraine headaches, sleep disorder, tachycardia who follows back up in the office today.    Patient is a history of reflux, she continues on omeprazole 40 mg a day.  She denies any dysphagia.  She does report that she has tried to taper self off of the medication and that she gets severe symptoms at night.  This is particular problematic as she does have insomnia which is quite severe and causing hallucinations.  She is concerned that she tried to taper again that this could further worsen her sleep cycle.    Patient has a history of IBS/diarrhea.  She has noted that certain foods tend to trigger her symptoms.  Particular greasy foods.    She does have a history of colon polyps and follow-up colonoscopy was advised in 3 years time from her prior procedure which would be about 2 years from now.            HISTORY:  Past Medical History:    Anxiety    Asthma (HCC)    When younger, exercise induced    Depression    Esophageal reflux    Essential hypertension    Fibromyalgia    Herniated cervical intervertebral disc    s/p C5/6 surgery    Herniated lumbar intervertebral disc    High blood pressure    Migraine with aura    Migraines    Sleep apnea    Sleep disorder    Sleep disorder    Tachycardia      Past Surgical History:   Procedure Laterality Date    Colonoscopy      Colonoscopy N/A 03/08/2024    colonoscopy with polypectomy, polyp x 1, biopsy    Colonoscopy N/A 3/8/2024    Procedure: COLONOSCOPY with polypectomy;  Surgeon: Mark Lindo MD;  Location: [facility] ENDOSCOPY    Knee surgery Bilateral [id number]    Bilateral knee arthroscopy    Leep      Spine surgery procedure unlisted  10/2015    C5-6 anterior discectomy and fusion.      Family History    Problem Relation Age of Onset    Cancer Mother     Colon Cancer Mother     Other (Gallbladder Surgery) Father     Breast Cancer Paternal Aunt 45        reoccur. 55      Social History:   Social History     Socioeconomic History    Marital status:    Tobacco Use    Smoking status: Smoker, Current Status Unknown     Current packs/day: 0.00     Types: Cigarettes     Last attempt to quit: 2016     Years since quittin.1    Smokeless tobacco: Never    Tobacco comments:     5-6 cigarettes/ day   Vaping Use    Vaping status: Never Used   Substance and Sexual Activity    Alcohol use: No     Alcohol/week: 0.0 standard drinks of alcohol    Drug use: No   Other Topics Concern    Caffeine Concern Yes     Comment: 1 daily    Reaction to local anesthetic No    Pt has a pacemaker No    Pt has a defibrillator No   Social History Narrative    The patient does not use an assistive device..      The patient does live in a home with stairs.        Medications (Active prior to today's visit):  Current Outpatient Medications   Medication Sig Dispense Refill    Omeprazole 40 MG Oral Capsule Delayed Release Take 1 capsule (40 mg total) by mouth daily. 30 capsule 11    PARoxetine HCl (PAXIL OR)       Clindamycin Phosphate 1 % External Swab       orphenadrine  MG Oral Tablet 12 Hr Take 1 tablet twice a day as needed for headache.      tiZANidine 4 MG Oral Tab Take 4 tablets (16 mg total) by mouth nightly.      levETIRAcetam 250 MG Oral Tab Take 1 tablet (250 mg total) by mouth 2 (two) times daily as needed.      haloperidol 5 MG Oral Tab       ketoconazole 2 % External Cream       BD LUER-JIMENEZ SYRINGE 23G X 1\" 3 ML Does not apply Misc       traMADol 50 MG Oral Tab       nystatin-triamcinolone 100,000-0.1 Units/g-% External Ointment Apply twice daily  Monday-Friday to affected areas of rash in armpits. Use only with flares. 60 g 1    gabapentin 400 MG Oral Cap       nebivolol 5 MG Oral Tab       Nebivolol HCl 20 MG Oral  Tab       hydrOXYzine 25 MG Oral Tab Take 1 tablet (25 mg total) by mouth nightly. Taking half tab (12.5mg)      EPINEPHrine (EPIPEN 2-FERNANDO) 0.3 MG/0.3ML Injection Solution Auto-injector Inject IM in event of  allergic reaction 1 each 0    Montelukast Sodium 10 MG Oral Tab Take 1 tablet (10 mg total) by mouth nightly.      Vitamin K, Phytonadione, 100 MCG Oral Tab Take by mouth.      Coenzyme Q10 (COQ10) 200 MG Oral Cap Take by mouth.      Benztropine Mesylate 1 MG Oral Tab Take 1 tablet (1 mg total) by mouth.      Cholecalciferol (VITAMIN D3) 10 MCG (400 UNIT) Oral Cap takes 1000 units daily      Vitamin B-12 250 MCG Oral Tab Take by mouth.      diphenhydrAMINE HCl 50 MG/ML Injection Solution       Galcanezumab-gnlm 120 MG/ML Subcutaneous Solution Auto-injector ADMINISTER 1 ML UNDER THE SKIN EVERY 30 DAYS      LORazepam 1 MG Oral Tab Take two tablet by mouth twice daily as needed for severe headache. Take with Haldol/Cogentin. Limit 20/month.      NYSTOP [id number] UNIT/GM External Powder       Venlafaxine HCl  MG Oral Capsule SR 24 Hr Take 1 capsule (150 mg total) by mouth.      Aspirin-Acetaminophen-Caffeine (EXCEDRIN MIGRAINE OR) Take by mouth as needed.        ibuprofen 200 MG Oral Tab Take 1 tablet (200 mg total) by mouth every 6 (six) hours as needed for Pain.      Armodafinil 250 MG Oral Tab Take by mouth daily. (Patient not taking: Reported on 8/14/2024)      PARoxetine 10 MG Oral Tab       dexamethasone 0.75 MG Oral Tab       clindamycin 300 MG Oral Cap       nabumetone 500 MG Oral Tab       sulfamethoxazole-trimethoprim -160 MG Oral Tab per tablet       varenicline 1 MG Oral Tab Take 1 tablet (1 mg total) by mouth 2 (two) times daily.      Metaxalone 800 MG Oral Tab       PAXLOVID, 300/100, 20 x 150 MG & 10 x 100MG Oral Tablet Therapy Pack  (Patient not taking: Reported on 1/3/2024)      azelastine 0.1 % Nasal Solution 2 sprays by Nasal route 2 (two) times daily. 30 mL 3    Lasmiditan Succinate  (REYVOW) 100 MG Oral Tab Take by mouth.         Allergies:  Allergies   Allergen Reactions    Ergotamine-Pb-Belladonna HIVES     Allergic to Bellergan    Fremanezumab-Vfrm SWELLING    Shellfish-Derived Products ANAPHYLAXIS    Coconut Fatty Acids RASH     Rash if eats coconut,cocunut oil is ok    Mold     Pollen      Sinus congestion    Seafood HIVES    Belladonna RASH     Bellergal reaction generalized rash    Coconut Flavor RASH    Fish Oil RASH         ROS:   The patient denies any chest pain or shortness of breath,  No neurologic or dermatologic symptoms.     PHYSICAL EXAM:   Blood pressure 118/83, pulse 86, height 5' 2\" (1.575 m), weight 224 lb (101.6 kg).    The patient appears their stated age and is in no acute distress  HEENT- anicteric sclera, neck no lymphadnopathy, OP- clear with no masses or lesions  Chest- Clear bilaterally, no wheezing,  Heart- regular rate, no murmur or gallop  Abdomen- Soft and nontender, nondistended  Ext- no clubbing or cyanosis  Skin- no rashes or lesions  Neuro- appropriate response, alert, no confusion  .  ASSESSMENT/PLAN:   Assessment     GERD  Hiatal hernia  IBS-D  History of colon polyps    The patient has a history of chronic reflux, she has a small hiatal hernia detected in the past we discussed dietary adjustments and behavioral changes such as not eating before bed.  She will continue on omeprazole.  We discussed the risks of chronic use of the medication and bowel disease versus the benefits.    She does have symptoms of IBS/D-plan to continue on dietary changes, avoid dairy and milk, high-fiber and avoid food triggers.    The patient will require repeat colonoscopy in 2 years time.    Plan  GERD  Hiatal hernia  -EGD with MAC  -continue on omeprazole  -dietary and behavioral changes    IBS-D  -diet adjustments    Hx colon polyps  -repeat colonoscopy in 2 years       Orders This Visit:  No orders of the defined types were placed in this encounter.      Meds This  Visit:  Requested Prescriptions     Signed Prescriptions Disp Refills    Omeprazole 40 MG Oral Capsule Delayed Release 30 capsule 11     Sig: Take 1 capsule (40 mg total) by mouth daily.       Imaging & Referrals:  None       Mark Lindo MD  [facility]

## 2024-08-14 NOTE — TELEPHONE ENCOUNTER
Patient was seen in office today and needs to be schedule for EGD procedure per orders below:    GERD  Hiatal hernia  -EGD with MAC      Thank you!

## 2024-08-14 NOTE — PATIENT INSTRUCTIONS
GERD  Hiatal hernia  -EGD with MAC  -continue on omeprazole  -dietary and behavioral changes    IBS-D  -diet adjustments    Hx colon polyps  -repeat colonoscopy in 2 years

## 2024-08-16 NOTE — TELEPHONE ENCOUNTER
Scheduled for:  EGD [id number]  Provider Name:  Dr. Lindo  Date:  11/7/2024  Location:   [facility]  Sedation:  MAC  Time:  3:30 PM - Patient is aware [facility] will call the day before with arrival time.  Prep:  NPO after midnight  Meds/Allergies Reconciled?:  Physician reviewed   Diagnosis with codes:  GERD K21.9; Hiatal hernia K44.9  Was patient informed to call insurance with codes (Y/N):  Yes, I confirmed [employer] insurance with the patient.   Referral sent?:  Referral was sent at the time of electronic surgical scheduling.   [facility] or [facility] notified?:  I sent an electronic request to Endo Scheduling and received a confirmation today.   Medication Orders:  Hold multivitamins/supplements one week prior to procedure.  Misc Orders:  N/A     Further instructions given by staff: I discussed the prep instructions with the patient which she verbally understood and is aware that I will send the instructions today.

## 2024-09-26 ENCOUNTER — HOSPITAL ENCOUNTER (OUTPATIENT)
Dept: MAMMOGRAPHY | Facility: HOSPITAL | Age: 44
Discharge: HOME | End: 2024-09-26
Attending: FAMILY MEDICINE
Payer: MEDICARE

## 2024-09-26 ENCOUNTER — HOSPITAL ENCOUNTER (OUTPATIENT)
Dept: ULTRASOUND IMAGING | Facility: HOSPITAL | Age: 44
Discharge: HOME | End: 2024-09-26
Attending: FAMILY MEDICINE
Payer: MEDICARE

## 2024-09-26 DIAGNOSIS — R92.8 ABNORMAL FINDINGS ON DIAGNOSTIC IMAGING OF BREAST: ICD-10-CM

## 2024-09-26 PROCEDURE — 77061 BREAST TOMOSYNTHESIS UNI: CPT | Performed by: FAMILY MEDICINE

## 2024-09-26 PROCEDURE — 77065 DX MAMMO INCL CAD UNI: CPT | Performed by: FAMILY MEDICINE

## 2024-09-26 PROCEDURE — 76642 ULTRASOUND BREAST LIMITED: CPT | Performed by: FAMILY MEDICINE

## 2024-11-07 ENCOUNTER — ANESTHESIA (OUTPATIENT)
Dept: ENDOSCOPY | Facility: HOSPITAL | Age: 44
End: 2024-11-07
Payer: MEDICARE

## 2024-11-07 ENCOUNTER — ANESTHESIA EVENT (OUTPATIENT)
Dept: ENDOSCOPY | Facility: HOSPITAL | Age: 44
End: 2024-11-07
Payer: MEDICARE

## 2024-11-07 ENCOUNTER — HOSPITAL ENCOUNTER (OUTPATIENT)
Facility: HOSPITAL | Age: 44
Setting detail: HOSPITAL OUTPATIENT SURGERY
Discharge: HOME | End: 2024-11-07
Attending: INTERNAL MEDICINE | Admitting: INTERNAL MEDICINE
Payer: MEDICARE

## 2024-11-07 VITALS
HEART RATE: 67 BPM | WEIGHT: 225 LBS | RESPIRATION RATE: 19 BRPM | DIASTOLIC BLOOD PRESSURE: 81 MMHG | HEIGHT: 62 IN | OXYGEN SATURATION: 100 % | BODY MASS INDEX: 41.41 KG/M2 | SYSTOLIC BLOOD PRESSURE: 140 MMHG

## 2024-11-07 DIAGNOSIS — K44.9 HIATAL HERNIA: ICD-10-CM

## 2024-11-07 DIAGNOSIS — K21.9 GASTROESOPHAGEAL REFLUX DISEASE, UNSPECIFIED WHETHER ESOPHAGITIS PRESENT: ICD-10-CM

## 2024-11-07 LAB — B-HCG UR QL: NEGATIVE

## 2024-11-07 PROCEDURE — 43239 EGD BIOPSY SINGLE/MULTIPLE: CPT | Performed by: INTERNAL MEDICINE

## 2024-11-07 PROCEDURE — 0DB48ZX EXCISION OF ESOPHAGOGASTRIC JUNCTION, VIA NATURAL OR ARTIFICIAL OPENING ENDOSCOPIC, DIAGNOSTIC: ICD-10-PCS | Performed by: INTERNAL MEDICINE

## 2024-11-07 PROCEDURE — 0DB68ZX EXCISION OF STOMACH, VIA NATURAL OR ARTIFICIAL OPENING ENDOSCOPIC, DIAGNOSTIC: ICD-10-PCS | Performed by: INTERNAL MEDICINE

## 2024-11-07 RX ORDER — CETIRIZINE HYDROCHLORIDE 10 MG/1
10 TABLET ORAL DAILY
COMMUNITY

## 2024-11-07 RX ORDER — SODIUM CHLORIDE, SODIUM LACTATE, POTASSIUM CHLORIDE, CALCIUM CHLORIDE 600; 310; 30; 20 MG/100ML; MG/100ML; MG/100ML; MG/100ML
INJECTION, SOLUTION INTRAVENOUS CONTINUOUS
Status: DISCONTINUED | OUTPATIENT
Start: 2024-11-07 | End: 2024-11-07

## 2024-11-07 RX ORDER — ONDANSETRON 2 MG/ML
4 INJECTION INTRAMUSCULAR; INTRAVENOUS ONCE AS NEEDED
Status: DISCONTINUED | OUTPATIENT
Start: 2024-11-07 | End: 2024-11-07

## 2024-11-07 RX ORDER — LIDOCAINE HYDROCHLORIDE 10 MG/ML
INJECTION, SOLUTION EPIDURAL; INFILTRATION; INTRACAUDAL; PERINEURAL AS NEEDED
Status: DISCONTINUED | OUTPATIENT
Start: 2024-11-07 | End: 2024-11-07 | Stop reason: SURG

## 2024-11-07 RX ORDER — NALOXONE HYDROCHLORIDE 0.4 MG/ML
0.08 INJECTION, SOLUTION INTRAMUSCULAR; INTRAVENOUS; SUBCUTANEOUS ONCE AS NEEDED
Status: DISCONTINUED | OUTPATIENT
Start: 2024-11-07 | End: 2024-11-07

## 2024-11-07 RX ADMIN — LIDOCAINE HYDROCHLORIDE 50 MG: 10 INJECTION, SOLUTION EPIDURAL; INFILTRATION; INTRACAUDAL; PERINEURAL at 15:08:00

## 2024-11-07 NOTE — H&P
History & Physical Examination    Patient Name: Taisha Ch  MRN: [id number]  CSN: [id number]  YOB: 1980    Diagnosis:   GERD  Hiatal hernia      Prescriptions Prior to Admission[1]  No current facility-administered medications for this encounter.       Allergies: Allergies[2]    Past Medical History:    Anxiety    Asthma (HCC)    When younger, exercise induced    Depression    Esophageal reflux    Essential hypertension    Fibromyalgia    Herniated cervical intervertebral disc    s/p C5/6 surgery    Herniated lumbar intervertebral disc    High blood pressure    Migraine with aura    Migraines    Sleep apnea    Sleep disorder    Sleep disorder    Tachycardia     Past Surgical History:   Procedure Laterality Date    Colonoscopy      Colonoscopy N/A 2024    colonoscopy with polypectomy, polyp x 1, biopsy    Colonoscopy N/A 3/8/2024    Procedure: COLONOSCOPY with polypectomy;  Surgeon: Mark Lindo MD;  Location: [facility] ENDOSCOPY    Knee surgery Bilateral [id number]    Bilateral knee arthroscopy    Leep      Spine surgery procedure unlisted  10/2015    C5-6 anterior discectomy and fusion.     Family History   Problem Relation Age of Onset    Cancer Mother     Colon Cancer Mother     Other (Gallbladder Surgery) Father     Breast Cancer Paternal Aunt 45        reoccur. 55     Social History     Tobacco Use    Smoking status: Former     Current packs/day: 0.00     Types: Cigarettes     Quit date: 2016     Years since quittin.4    Smokeless tobacco: Never    Tobacco comments:     Quit   Substance Use Topics    Alcohol use: No     Alcohol/week: 0.0 standard drinks of alcohol       SYSTEM Check if Review is Normal Check if Physical Exam is Normal If not normal, please explain:   HEENT [x ] [ x]    NECK & BACK [x ] [x ]    HEART [x ] [ x]    LUNGS [x ] [ x]    ABDOMEN [x ] [x ]    UROGENITAL [ ] [ ]    EXTREMITIES [x ] [x ]    OTHER        [ x ] I have discussed the risks and benefits and  alternatives with the patient/family.  They understand and agree to proceed with plan of care.  [ x ] I have reviewed the History and Physical done within the last 30 days.  Any changes noted above.    Mark Lindo MD  11/7/2024  1:05 PM         [1]   Medications Prior to Admission   Medication Sig Dispense Refill Last Dose/Taking    Armodafinil 250 MG Oral Tab Take by mouth daily.   11/4/2024    Omeprazole 40 MG Oral Capsule Delayed Release Take 1 capsule (40 mg total) by mouth daily. 30 capsule 11 Taking    PARoxetine HCl (PAXIL OR)    Taking    Clindamycin Phosphate 1 % External Swab    Taking    tiZANidine 4 MG Oral Tab Take 4 tablets (16 mg total) by mouth nightly.   Taking    levETIRAcetam 250 MG Oral Tab Take 1 tablet (250 mg total) by mouth 2 (two) times daily as needed.   Taking As Needed    haloperidol 5 MG Oral Tab    Taking    ketoconazole 2 % External Cream    Taking    traMADol 50 MG Oral Tab    Taking    nystatin-triamcinolone 100,000-0.1 Units/g-% External Ointment Apply twice daily  Monday-Friday to affected areas of rash in armpits. Use only with flares. 60 g 1 Taking    gabapentin 400 MG Oral Cap Take 600 mg by mouth daily.   Taking    nebivolol 5 MG Oral Tab    Taking    Nebivolol HCl 20 MG Oral Tab    Taking    hydrOXYzine 25 MG Oral Tab Take 1 tablet (25 mg total) by mouth nightly. Taking half tab (12.5mg)   Taking    EPINEPHrine (EPIPEN 2-FERNANDO) 0.3 MG/0.3ML Injection Solution Auto-injector Inject IM in event of  allergic reaction 1 each 0 Taking    Montelukast Sodium 10 MG Oral Tab Take 1 tablet (10 mg total) by mouth nightly.   Taking    Vitamin K, Phytonadione, 100 MCG Oral Tab Take by mouth.   Taking    Coenzyme Q10 (COQ10) 200 MG Oral Cap Take by mouth.   Taking    Benztropine Mesylate 1 MG Oral Tab Take 1 tablet (1 mg total) by mouth.   Taking    Cholecalciferol (VITAMIN D3) 10 MCG (400 UNIT) Oral Cap takes 1000 units daily   Taking    Vitamin B-12 250 MCG Oral Tab Take by mouth.    Taking    diphenhydrAMINE HCl 50 MG/ML Injection Solution    Taking    Galcanezumab-gnlm 120 MG/ML Subcutaneous Solution Auto-injector ADMINISTER 1 ML UNDER THE SKIN EVERY 30 DAYS   Taking    LORazepam 1 MG Oral Tab Take two tablet by mouth twice daily as needed for severe headache. Take with Haldol/Cogentin. Limit 20/month.   Taking    NYSTOP [id number] UNIT/GM External Powder    Taking    Venlafaxine HCl  MG Oral Capsule SR 24 Hr Take 1 capsule (150 mg total) by mouth.   Taking    Aspirin-Acetaminophen-Caffeine (EXCEDRIN MIGRAINE OR) Take by mouth as needed.     Taking As Needed    ibuprofen 200 MG Oral Tab Take 1 tablet (200 mg total) by mouth every 6 (six) hours as needed for Pain.   Taking As Needed    PARoxetine 10 MG Oral Tab        orphenadrine  MG Oral Tablet 12 Hr Take 1 tablet twice a day as needed for headache. (Patient not taking: Reported on 11/4/2024)   Not Taking    dexamethasone 0.75 MG Oral Tab        clindamycin 300 MG Oral Cap        nabumetone 500 MG Oral Tab        sulfamethoxazole-trimethoprim -160 MG Oral Tab per tablet        BD LUER-JIMENEZ SYRINGE 23G X 1\" 3 ML Does not apply Misc        varenicline 1 MG Oral Tab Take 1 tablet (1 mg total) by mouth 2 (two) times daily.       Metaxalone 800 MG Oral Tab        PAXLOVID, 300/100, 20 x 150 MG & 10 x 100MG Oral Tablet Therapy Pack  (Patient not taking: Reported on 1/3/2024)       azelastine 0.1 % Nasal Solution 2 sprays by Nasal route 2 (two) times daily. 30 mL 3     Lasmiditan Succinate (REYVOW) 100 MG Oral Tab Take by mouth.      [2]   Allergies  Allergen Reactions    Ergotamine-Pb-Belladonna HIVES     Allergic to Bellergan    Fremanezumab-Vfrm SWELLING    Shellfish-Derived Products ANAPHYLAXIS    Coconut Fatty Acids RASH     Rash if eats coconut,cocunut oil is ok    Mold     Pollen      Sinus congestion    Seafood HIVES    Belladonna RASH     Bellergal reaction generalized rash    Coconut Flavor RASH    Fish Oil RASH

## 2024-11-07 NOTE — ANESTHESIA POSTPROCEDURE EVALUATION
Patient: Taisha Ch    Procedure Summary       Date: 11/07/24 Room / Location: [facility] ENDOSCOPY 04 / [facility] ENDOSCOPY    Anesthesia Start: 1502 Anesthesia Stop:     Procedure: ESOPHAGOGASTRODUODENOSCOPY with biopsies Diagnosis:       Gastroesophageal reflux disease, unspecified whether esophagitis present      Hiatal hernia      (gastritis, gastric polyp, irregular z line)    Surgeons: Mark Lindo MD Anesthesiologist: Garrison Rosa CRNA    Anesthesia Type: MAC ASA Status: 2            Anesthesia Type: MAC    Vitals Value Taken Time   /97 11/07/24 1527   Temp  11/07/24 1531   Pulse 61 11/07/24 1530   Resp 18 11/07/24 1530   SpO2 100 % 11/07/24 1530   Vitals shown include unfiled device data.    [facility] AN Post Evaluation:   Patient Evaluated in PACU  Patient Participation: complete - patient participated  Level of Consciousness: awake, awake and alert and responsive to verbal stimuli  Pain Score: 0  Pain Management: adequate  Airway Patency:patent  Yes    Nausea/Vomiting: none  Cardiovascular Status: acceptable, stable and hemodynamically stable  Respiratory Status: spontaneous ventilation, nonlabored ventilation and room air  Postoperative Hydration acceptable      Garrison Rosa CRNA  11/7/2024 3:31 PM

## 2024-11-07 NOTE — DISCHARGE INSTRUCTIONS
Home Care Instructions for Gastroscopy with Sedation    Diet:  - Resume your regular diet as tolerated unless otherwise instructed.  - Start with light meals to minimize bloating.  - Do not drink alcohol today.    Medication:  - If you have questions about resuming your normal medications, please contact your Primary Care Physician.    Activities:  - Take it easy today. Do not return to work today.  - Do not drive today.  - Do not operate any machinery today (including kitchen equipment).    Gastroscopy:  - You may have a sore throat for 2-3 days following the exam. This is normal. Gargling with warm salt water (1/2 tsp salt to 1 glass warm water) or using throat lozenges will help.  - If you experience any sharp pain in your neck, abdomen or chest, vomiting of blood, oral temperature over 100 degrees Fahrenheit, light-headedness or dizziness, or any other problems, contact your doctor.    **If unable to reach your doctor, please go to the [facility] Emergency Room**    - Your referring physician will receive a full report of your examination.  - If you do not hear from your doctor's office within two weeks of your biopsy, please call them for your results.    You may be able to see your laboratory results in [employer] between 4 and 7 business days.  In some cases, your physician may not have viewed the results before they are released to [employer].  If you have questions regarding your results contact the physician who ordered the test/exam by phone or via [employer] by choosing \"Ask a Medical Question.\"

## 2024-11-07 NOTE — ANESTHESIA PREPROCEDURE EVALUATION
Anesthesia PreOp Note    HPI:     Taisha Ch is a 44 year old female who presents for preoperative consultation requested by: Mark Lindo MD    Date of Surgery: 11/7/2024    Procedure(s):  ESOPHAGOGASTRODUODENOSCOPY  Indication: Gastroesophageal reflux disease, unspecified whether esophagitis present/ Hiatal hernia    Relevant Problems   No relevant active problems       NPO:  Last Liquid Consumption Date: 11/07/24  Last Liquid Consumption Time: 0900  Last Solid Consumption Date: 11/06/24  Last Solid Consumption Time: 1800  Last Liquid Consumption Date: 11/07/24          History Review:  Patient Active Problem List    Diagnosis Date Noted    Severe obesity (BMI >= 40) (HCC) 08/14/2024    Sebaceous cyst of skin of both breasts 03/28/2023    Variants of migraine, not elsewhere classified, with intractable migraine, so stated, with status migrainosus 06/23/2016    Depression 06/15/2016    Anxiety 06/15/2016    Sleep disorder 06/15/2016    H/O knee surgery 06/15/2016    H/O LEEP 06/15/2016    H/O cervical spine surgery 06/15/2016    DJD (degenerative joint disease) of cervical spine 06/15/2016    Degenerative joint disease (DJD) of lumbar spine 06/15/2016       Past Medical History:    Anxiety    Asthma (HCC)    When younger, exercise induced    Depression    Esophageal reflux    Essential hypertension    Fibromyalgia    Herniated cervical intervertebral disc    s/p C5/6 surgery    Herniated lumbar intervertebral disc    High blood pressure    Migraine with aura    Migraines    Sleep apnea    Sleep disorder    Sleep disorder    Tachycardia       Past Surgical History:   Procedure Laterality Date    Colonoscopy      Colonoscopy N/A 03/08/2024    colonoscopy with polypectomy, polyp x 1, biopsy    Colonoscopy N/A 3/8/2024    Procedure: COLONOSCOPY with polypectomy;  Surgeon: Mark Lindo MD;  Location: [facility] ENDOSCOPY    Knee surgery Bilateral [id number]    Bilateral knee arthroscopy    Leep      Spine  surgery procedure unlisted  10/2015    C5-6 anterior discectomy and fusion.       Prescriptions Prior to Admission[1]  Current Medications and Prescriptions Ordered in Epic[2]    Allergies[3]    Family History   Problem Relation Age of Onset    Cancer Mother     Colon Cancer Mother     Other (Gallbladder Surgery) Father     Breast Cancer Paternal Aunt 45        reoccur. 55     Social History     Socioeconomic History    Marital status:    Tobacco Use    Smoking status: Former     Current packs/day: 0.00     Types: Cigarettes     Quit date: 2016     Years since quittin.4    Smokeless tobacco: Never    Tobacco comments:     Quit   Vaping Use    Vaping status: Never Used   Substance and Sexual Activity    Alcohol use: No     Alcohol/week: 0.0 standard drinks of alcohol    Drug use: No   Other Topics Concern    Caffeine Concern Yes     Comment: 1 daily    Reaction to local anesthetic No    Pt has a pacemaker No    Pt has a defibrillator No       Available pre-op labs reviewed.  Lab Results   Component Value Date    URINEPREG Negative 2024             Vital Signs:  Body mass index is 41.15 kg/m².   height is 1.575 m (5' 2\") and weight is 102.1 kg (225 lb). Her blood pressure is 131/93 (abnormal) and her pulse is 71. Her respiration is 23 and oxygen saturation is 97%.   Vitals:    24 1715 24 1319   BP:  (!) 131/93   Pulse:  71   Resp:  23   SpO2:  97%   Weight: 102.1 kg (225 lb)    Height: 1.575 m (5' 2\")         Anesthesia Evaluation     Patient summary reviewed and Nursing notes reviewed    No history of anesthetic complications   Airway   Mallampati: III  TM distance: >3 FB  Neck ROM: full  Dental - Dentition appears grossly intact     Pulmonary - normal exam   (+) asthma, sleep apnea    ROS comment: Former smoker - quit 1 year ago   Cardiovascular - normal exam  (+) hypertension    Rhythm: regular  Rate: normal    Neuro/Psych    (+)  neuromuscular disease, headaches, anxiety/panic  attacks,  depression      Comments: C5-6 anterior discectomy and fusion. Good ROM     Migraines       Panic attacks     GI/Hepatic/Renal    (+) GERD    Endo/Other    Abdominal                  Anesthesia Plan:   ASA:  2  Plan:   MAC  Informed Consent Plan and Risks Discussed With:  Patient  Discussed plan with:  Surgeon      I have informed Taisha Ch and/or legal guardian or family member of the nature of the anesthetic plan, benefits, risks including possible dental damage if relevant, major complications, and any alternative forms of anesthetic management.   All of the patient's questions were answered to the best of my ability. The patient desires the anesthetic management as planned.  Garrison Rosa, ASTRID  11/7/2024 2:02 PM  Present on Admission:  **None**           [1]   Medications Prior to Admission   Medication Sig Dispense Refill Last Dose/Taking    cetirizine 10 MG Oral Tab Take 1 tablet (10 mg total) by mouth daily.   11/6/2024 Bedtime    Armodafinil 250 MG Oral Tab Take by mouth daily.   11/4/2024    Omeprazole 40 MG Oral Capsule Delayed Release Take 1 capsule (40 mg total) by mouth daily. 30 capsule 11 11/6/2024    PARoxetine HCl (PAXIL OR)    11/6/2024 Morning    Clindamycin Phosphate 1 % External Swab    Taking    tiZANidine 4 MG Oral Tab Take 4 tablets (16 mg total) by mouth nightly.   11/6/2024 Evening    levETIRAcetam 250 MG Oral Tab Take 1 tablet (250 mg total) by mouth 2 (two) times daily as needed.   11/4/2024    haloperidol 5 MG Oral Tab    11/5/2024    ketoconazole 2 % External Cream    Taking    traMADol 50 MG Oral Tab    10/31/2024    nystatin-triamcinolone 100,000-0.1 Units/g-% External Ointment Apply twice daily  Monday-Friday to affected areas of rash in armpits. Use only with flares. 60 g 1 11/4/2024    gabapentin 400 MG Oral Cap Take 600 mg by mouth daily.   11/6/2024    nebivolol 5 MG Oral Tab    11/6/2024    Nebivolol HCl 20 MG Oral Tab    11/6/2024    hydrOXYzine 25 MG Oral  Tab Take 1 tablet (25 mg total) by mouth nightly. Taking half tab (12.5mg)   11/6/2024    EPINEPHrine (EPIPEN 2-FERNANDO) 0.3 MG/0.3ML Injection Solution Auto-injector Inject IM in event of  allergic reaction 1 each 0 Taking    Montelukast Sodium 10 MG Oral Tab Take 1 tablet (10 mg total) by mouth nightly.   11/6/2024    Vitamin K, Phytonadione, 100 MCG Oral Tab Take by mouth.   11/6/2024 Morning    Coenzyme Q10 (COQ10) 200 MG Oral Cap Take by mouth.   Taking    Benztropine Mesylate 1 MG Oral Tab Take 1 tablet (1 mg total) by mouth.   11/5/2024    Cholecalciferol (VITAMIN D3) 10 MCG (400 UNIT) Oral Cap takes 1000 units daily   11/5/2024    Vitamin B-12 250 MCG Oral Tab Take by mouth.   11/6/2024 Morning    diphenhydrAMINE HCl 50 MG/ML Injection Solution    Taking    Galcanezumab-gnlm 120 MG/ML Subcutaneous Solution Auto-injector ADMINISTER 1 ML UNDER THE SKIN EVERY 30 DAYS   10/25/2024    LORazepam 1 MG Oral Tab Take two tablet by mouth twice daily as needed for severe headache. Take with Haldol/Cogentin. Limit 20/month.   11/5/2024    NYSTOP [id number] UNIT/GM External Powder    Taking    Venlafaxine HCl  MG Oral Capsule SR 24 Hr Take 1 capsule (150 mg total) by mouth.   11/6/2024 Morning    Aspirin-Acetaminophen-Caffeine (EXCEDRIN MIGRAINE OR) Take by mouth as needed.     Taking As Needed    ibuprofen 200 MG Oral Tab Take 1 tablet (200 mg total) by mouth every 6 (six) hours as needed for Pain.   11/4/2024    PARoxetine 10 MG Oral Tab        orphenadrine  MG Oral Tablet 12 Hr Take 1 tablet twice a day as needed for headache. (Patient not taking: Reported on 11/4/2024)   Not Taking    dexamethasone 0.75 MG Oral Tab        clindamycin 300 MG Oral Cap        nabumetone 500 MG Oral Tab        sulfamethoxazole-trimethoprim -160 MG Oral Tab per tablet        BD LUER-JIMENEZ SYRINGE 23G X 1\" 3 ML Does not apply Misc        varenicline 1 MG Oral Tab Take 1 tablet (1 mg total) by mouth 2 (two) times daily.        Metaxalone 800 MG Oral Tab        PAXLOVID, 300/100, 20 x 150 MG & 10 x 100MG Oral Tablet Therapy Pack  (Patient not taking: Reported on 1/3/2024)       azelastine 0.1 % Nasal Solution 2 sprays by Nasal route 2 (two) times daily. 30 mL 3     Lasmiditan Succinate (REYVOW) 100 MG Oral Tab Take by mouth.      [2]   Current Facility-Administered Medications Ordered in Epic   Medication Dose Route Frequency Provider Last Rate Last Admin    lactated ringers infusion   Intravenous Continuous Mark Lindo MD         No current [facility]-ordered outpatient medications on file.   [3]   Allergies  Allergen Reactions    Ergotamine-Pb-Belladonna HIVES     Allergic to Bellergan    Fremanezumab-Vfrm SWELLING    Shellfish-Derived Products ANAPHYLAXIS    Coconut Fatty Acids RASH     Rash if eats coconut,cocunut oil is ok    Mold     Pollen      Sinus congestion    Seafood HIVES    Belladonna RASH     Bellergal reaction generalized rash    Coconut Flavor RASH    Fish Oil RASH

## 2024-11-07 NOTE — OPERATIVE REPORT
[facility] Endoscopy Report  Date of procedure-November 7, 2024    Preoperative Diagnosis:  -GERD  -History of hiatal hernia      Postoperative Diagnosis:  -Gastritis  -Gastric polyp  -Irregular Z-line      Procedure:    Esophagogastroduodenoscopy       Surgeon:  Mark Lindo M.D.    Anesthesia:  MAC    Technique:  After informed consent, the patient was placed in the left lateral recumbent position. An Olympus adult HD gastroscope was inserted into the hypopharynx and advanced under direct vision into the esophagus, stomach and duodenum.  The endoscope was withdrawn to the stomach where retroflexion of the annulus, body, cardia and fundus was performed.  The instrument was straightened, insufflated air and fluid were suctioned and the endoscope was withdrawn.  The procedure was well tolerated without immediate complication.      Findings:  The esophagus showed an irregular Z-line at the GE junction, biopsies taken.  The GE junction and diaphragmatic impression were at 39 cm.  The stomach distended appropriately with insufflated air.  The mucosa of the stomach showed patchy erythema in the antrum of the stomach, biopsies taken.  Additionally fundic gland like polyps noted in the body, biopsies taken here as well.  The duodenal bulb and post bulbar regions were normal.    Estimated blood loss-insignificant  Specimens-see above    Impression:  -Gastritis  -Gastric polyp  -Irregular Z-line    Recommendations:  - Post procedure instructions given  - Follow up on upper GI biopsies          Mark Lindo MD  11/7/2024  3:18 PM

## 2024-11-08 ENCOUNTER — MED REC SCAN ONLY (OUTPATIENT)
Dept: GASTROENTEROLOGY | Facility: CLINIC | Age: 44
End: 2024-11-08

## 2024-11-11 ENCOUNTER — TELEPHONE (OUTPATIENT)
Facility: CLINIC | Age: 44
End: 2024-11-11

## 2024-11-11 NOTE — TELEPHONE ENCOUNTER
Patient viewed below result note in MyChart:  Seen by patient Taisha Deluca Jing on 11/8/2024  5:06 PM

## 2024-11-11 NOTE — TELEPHONE ENCOUNTER
----- Message from Mark Lindo sent at 11/8/2024  3:18 PM CST -----  EGD showed a hiatal hernia, gastric polyps ( benign)  and gastritis ( samples taken were negative for H pylori )

## 2025-02-07 ENCOUNTER — TELEPHONE (OUTPATIENT)
Dept: OBGYN CLINIC | Facility: CLINIC | Age: 45
End: 2025-02-07

## 2025-02-07 NOTE — TELEPHONE ENCOUNTER
Last annual: 4/27/2023  PAP: per notes, needed in 2024  Mammo: Due March, 2025    Taisha called, informed would need up to date with annual, pap and Mammogram. She states she did complete PAP with PCP last year and will bring a copy of results with to here appointment. She would like to be seen as soon as possible. She does accept an appointment with Marisa Tomlinson on 2/13. She is aware IUD will not be exchanged at that appointment.

## 2025-02-13 ENCOUNTER — OFFICE VISIT (OUTPATIENT)
Dept: OBGYN CLINIC | Facility: CLINIC | Age: 45
End: 2025-02-13
Payer: MEDICARE

## 2025-02-13 VITALS
WEIGHT: 245 LBS | HEART RATE: 85 BPM | BODY MASS INDEX: 45 KG/M2 | SYSTOLIC BLOOD PRESSURE: 135 MMHG | DIASTOLIC BLOOD PRESSURE: 88 MMHG

## 2025-02-13 DIAGNOSIS — Z12.31 SCREENING MAMMOGRAM FOR BREAST CANCER: ICD-10-CM

## 2025-02-13 DIAGNOSIS — Z01.419 WELL WOMAN EXAM WITH ROUTINE GYNECOLOGICAL EXAM: Primary | ICD-10-CM

## 2025-02-13 DIAGNOSIS — Z80.3 FAMILY HISTORY OF BREAST CANCER: ICD-10-CM

## 2025-02-13 DIAGNOSIS — Z97.5 PRESENCE OF IUD: ICD-10-CM

## 2025-02-13 NOTE — PROGRESS NOTES
[facility]    Obstetrics and Gynecology    Chief Complaint   Patient presents with    Gyn Exam     ANNUAL EXAM     Consult     DISCUSS IUD REPLACEMENT        Taisha Ch is a 44 year old female  No LMP recorded. (Menstrual status: IUD - Intrauterine Device). presenting for annual gynecology exam.  Last seen 2023 with Dr. Liu  Weaning off venlaxafine 37.5 and starting paxil.  Taking keppra for migraines  Mirena IUD since - amenorrhea. Had dysmenorrhea. Reviewed due for removal in . Desires removal and replacement this year. Reviewed to call her insurance for coverage.    Not sexually active.no pelvic pain or abnormal discharge    Pap:3/2024 NILM, negative human papillomavirus with pCP - report brought with her.   per patient  hx of leep in 2011 with normal paps since     Contraception:mirena IUD since   Mammo: 2024 - due bilateral screening in 2025      OBSTETRICS HISTORY:  OB History    Para Term  AB Living   0 0 0 0 0 0   SAB IAB Ectopic Multiple Live Births   0 0 0 0 0       GYNE HISTORY:  Menarche: 16 YEARS OLD (2025  4:43 PM)  Period Cycle (Days): NO PERIOD WITH IUD (2025  4:43 PM)  Use of Birth Control (if yes, specify type): Mirena IUD (2025  4:43 PM)  Pap Date: 24 (2025  4:43 PM)  Pap Result Notes: PAP/HPV NEG (2025  4:43 PM)  Follow Up Recommendation: MAMMO BILATERAL 24 DIAG C2- BENIGN /// 23 JMN (2025  4:43 PM)      History   Sexual Activity    Sexual activity: Not on file       MEDICAL HISTORY:  Past Medical History:    Anxiety    Asthma (HCC)    When younger, exercise induced    Depression    Esophageal reflux    Essential hypertension    Fibromyalgia    Herniated cervical intervertebral disc    s/p C5/6 surgery    Herniated lumbar intervertebral disc    High blood pressure    Migraine with aura    Migraines    Sleep apnea    Sleep disorder    Sleep disorder    Tachycardia     Past Surgical  History:   Procedure Laterality Date    Colonoscopy      Colonoscopy N/A 2024    colonoscopy with polypectomy, polyp x 1, biopsy    Colonoscopy N/A 2024    Procedure: COLONOSCOPY with polypectomy;  Surgeon: Mark Lindo MD;  Location: [facility] ENDOSCOPY    Egd  2024    Dr. Lindo, gastritis, gastric polyp, irregular z line    Knee surgery Bilateral [id number]    Bilateral knee arthroscopy    Leep      Spine surgery procedure unlisted  10/2015    C5-6 anterior discectomy and fusion.       SOCIAL HISTORY:  Social History     Socioeconomic History    Marital status:      Spouse name: Not on file    Number of children: Not on file    Years of education: Not on file    Highest education level: Not on file   Occupational History    Not on file   Tobacco Use    Smoking status: Former     Current packs/day: 0.00     Types: Cigarettes     Quit date: 2016     Years since quittin.6    Smokeless tobacco: Never    Tobacco comments:     Quit   Vaping Use    Vaping status: Never Used   Substance and Sexual Activity    Alcohol use: No     Alcohol/week: 0.0 standard drinks of alcohol    Drug use: No    Sexual activity: Not on file   Other Topics Concern     Service Not Asked    Blood Transfusions Not Asked    Caffeine Concern Yes     Comment: 1 daily    Occupational Exposure Not Asked    Hobby Hazards Not Asked    Sleep Concern Not Asked    Stress Concern Not Asked    Weight Concern Not Asked    Special Diet Not Asked    Back Care Not Asked    Exercise Not Asked     Comment: 2 times week walk,stretching    Bike Helmet Not Asked    Seat Belt Not Asked    Self-Exams Not Asked    Grew up on a farm Not Asked    History of tanning Not Asked    Outdoor occupation Not Asked    Breast feeding Not Asked    Reaction to local anesthetic No    Pt has a pacemaker No    Pt has a defibrillator No   Social History Narrative    The patient does not use an assistive device..      The patient does live in a home  with stairs.     Social Drivers of Health     Food Insecurity: Not on file   Transportation Needs: Not on file   Stress: Not on file   Housing Stability: Not on file         Depression Screening (PHQ-2/PHQ-9): Over the LAST 2 WEEKS   Little interest or pleasure in doing things (over the last two weeks)?: Several days    Feeling down, depressed, or hopeless (over the last two weeks)?: Several days    PHQ-2 SCORE: 2   1.    2.    3.    4.    5.    6.    7.    8.    9.                 FAMILY HISTORY:  Family History   Problem Relation Age of Onset    Cancer Mother     Colon Cancer Mother     Other (Gallbladder Surgery) Father     Breast Cancer Paternal Aunt 45        reoccur. 55       MEDICATIONS:    Current Outpatient Medications:     cetirizine 10 MG Oral Tab, Take 1 tablet (10 mg total) by mouth daily., Disp: , Rfl:     Omeprazole 40 MG Oral Capsule Delayed Release, Take 1 capsule (40 mg total) by mouth daily., Disp: 30 capsule, Rfl: 11    Clindamycin Phosphate 1 % External Swab, , Disp: , Rfl:     tiZANidine 4 MG Oral Tab, Take 4 tablets (16 mg total) by mouth nightly., Disp: , Rfl:     levETIRAcetam 250 MG Oral Tab, Take 1 tablet (250 mg total) by mouth 2 (two) times daily as needed., Disp: , Rfl:     haloperidol 5 MG Oral Tab, , Disp: , Rfl:     traMADol 50 MG Oral Tab, , Disp: , Rfl:     nystatin-triamcinolone 100,000-0.1 Units/g-% External Ointment, Apply twice daily  Monday-Friday to affected areas of rash in armpits. Use only with flares., Disp: 60 g, Rfl: 1    gabapentin 400 MG Oral Cap, Take 600 mg by mouth daily., Disp: , Rfl:     nebivolol 5 MG Oral Tab, , Disp: , Rfl:     Nebivolol HCl 20 MG Oral Tab, , Disp: , Rfl:     hydrOXYzine 25 MG Oral Tab, Take 1 tablet (25 mg total) by mouth nightly. Taking half tab (12.5mg), Disp: , Rfl:     EPINEPHrine (EPIPEN 2-FERNANDO) 0.3 MG/0.3ML Injection Solution Auto-injector, Inject IM in event of  allergic reaction, Disp: 1 each, Rfl: 0    Montelukast Sodium 10 MG Oral  Tab, Take 1 tablet (10 mg total) by mouth nightly., Disp: , Rfl:     Vitamin K, Phytonadione, 100 MCG Oral Tab, Take by mouth., Disp: , Rfl:     Benztropine Mesylate 1 MG Oral Tab, Take 1 tablet (1 mg total) by mouth., Disp: , Rfl:     Cholecalciferol (VITAMIN D3) 10 MCG (400 UNIT) Oral Cap, takes 1000 units daily, Disp: , Rfl:     Vitamin B-12 250 MCG Oral Tab, Take by mouth., Disp: , Rfl:     diphenhydrAMINE HCl 50 MG/ML Injection Solution, , Disp: , Rfl:     LORazepam 1 MG Oral Tab, Take two tablet by mouth twice daily as needed for severe headache. Take with Haldol/Cogentin. Limit 20/month., Disp: , Rfl:     Venlafaxine HCl  MG Oral Capsule SR 24 Hr, Take 1 capsule (150 mg total) by mouth., Disp: , Rfl:     Aspirin-Acetaminophen-Caffeine (EXCEDRIN MIGRAINE OR), Take by mouth as needed.  , Disp: , Rfl:     PARoxetine 10 MG Oral Tab, , Disp: , Rfl:     ketoconazole 2 % External Cream, , Disp: , Rfl:     BD LUER-JIMENEZ SYRINGE 23G X 1\" 3 ML Does not apply Misc, , Disp: , Rfl:     Galcanezumab-gnlm 120 MG/ML Subcutaneous Solution Auto-injector, ADMINISTER 1 ML UNDER THE SKIN EVERY 30 DAYS (Patient not taking: Reported on 2/13/2025), Disp: , Rfl:     ALLERGIES:  Allergies[1]      Review of Systems:  Constitutional:  Denies fatigue, night sweats, hot flashes  Eyes:  denies blurred or double vision  Cardiovascular:  denies chest pain or palpitations  Respiratory:  denies shortness of breath  Gastrointestinal:  denies heartburn, abdominal pain, diarrhea or constipation  Genitourinary:  denies dysuria, incontinence, abnormal vaginal discharge, vaginal itching,   Musculoskeletal:  denies back pain   Skin/Breast:  Denies any breast pain, lumps, or discharge.   Neurological:  denies headaches, extremity weakness or numbness.  Psychiatric: denies depression or anxiety.  Endocrine:   denies excessive thirst or urination.  Heme/Lymph:  denies history of anemia, easy bruising or bleeding.      PHYSICAL EXAM:     Vitals:     02/13/25 1648   BP: 135/88   Pulse: 85   Weight: 245 lb (111.1 kg)       Body mass index is 44.81 kg/m².     Patient offered chaperone, patient declined    Constitutional: well developed, well nourished  Psychiatric:  Oriented to time, place, person and situation. Appropriate mood and affect  Head/Face: normocephalic  Neck/Thyroid: thyroid symmetric, no thyromegaly, no nodules, no adenopathy  Lymphatic:no abnormal supraclavicular or axillary adenopathy is noted  Breast: normal without palpable masses, tenderness, asymmetry, nipple discharge, nipple retraction or skin changes  Abdomen:  +central obesity, soft, nontender, nondistended, no masses  Skin/Hair: no unusual rashes or bruises  Extremities: no edema, no cyanosis    Pelvic Exam:  External Genitalia: normal appearance, hair distribution, and no lesions  Urethral Meatus:  normal in size, location, without lesions and prolapse  Bladder:  No fullness, masses or tenderness  Vagina:  Normal appearance without lesions, no abnormal discharge  Cervix:  +IUD strings, Normal without tenderness on motion  Uterus: normal in size, contour, position, mobility, without tenderness  Adnexa: normal without masses or tenderness  Perineum: normal  Anus: no hemorroids     Assessment & Plan:    ICD-10-CM    1. Well woman exam with routine gynecological exam  Z01.419       2. Screening mammogram for breast cancer  Z12.31 [facility] VONDA 2D+3D SCREENING BILAT (CPT=77067/22234)      3. Family history of breast cancer  Z80.3 [facility] VONDA 2D+3D SCREENING BILAT (CPT=77067/90927)     High Risk Breast Clinic Referral - [facility]      4. Presence of IUD  Z97.5          Reviewed ASCCP guidelines with the patient   Pap just had with PCP in 3/2024- scanned result in to chart.  Contraception: Using mirena IUD for dysmenorrhea. Desires removal and replacement this year. Reviewed to check with insurance  Breast Health:     Reviewed current guidelines with the patient and to start Mammograms at age  42  Reviewed monthly self breast exams with the patient   Discussed diet, exercise, MVIs with Ca/Vit D  Follow up in 1 yr for MARQUIS Mcgowan    This note was prepared using Dragon Medical voice recognition dictation software. As a result errors may occur. When identified these errors have been corrected. While every attempt is made to correct errors during dictation discrepancies may still exist.         [1]   Allergies  Allergen Reactions    Ergotamine-Pb-Belladonna HIVES     Allergic to Bellergan    Fremanezumab-Vfrm SWELLING    Shellfish-Derived Products ANAPHYLAXIS    Coconut Fatty Acids RASH     Rash if eats coconut,cocunut oil is ok    Mold     Pollen      Sinus congestion    Seafood HIVES    Belladonna RASH     Bellergal reaction generalized rash    Coconut Flavor RASH    Fish Oil RASH

## 2025-02-14 ENCOUNTER — TELEPHONE (OUTPATIENT)
Dept: OBGYN CLINIC | Facility: CLINIC | Age: 45
End: 2025-02-14

## 2025-02-20 ENCOUNTER — OFFICE VISIT (OUTPATIENT)
Dept: OBGYN CLINIC | Facility: CLINIC | Age: 45
End: 2025-02-20
Payer: MEDICARE

## 2025-02-20 VITALS
BODY MASS INDEX: 45 KG/M2 | SYSTOLIC BLOOD PRESSURE: 131 MMHG | HEART RATE: 96 BPM | WEIGHT: 243.31 LBS | DIASTOLIC BLOOD PRESSURE: 85 MMHG

## 2025-02-20 DIAGNOSIS — Z30.433 ENCOUNTER FOR IUD REMOVAL AND REINSERTION: ICD-10-CM

## 2025-02-20 DIAGNOSIS — Z32.02 PREGNANCY EXAMINATION OR TEST, NEGATIVE RESULT: Primary | ICD-10-CM

## 2025-02-20 LAB
CONTROL LINE PRESENT WITH A CLEAR BACKGROUND (YES/NO): YES YES/NO
KIT LOT #: NORMAL NUMERIC

## 2025-02-20 PROCEDURE — 3075F SYST BP GE 130 - 139MM HG: CPT | Performed by: NURSE PRACTITIONER

## 2025-02-20 PROCEDURE — 81025 URINE PREGNANCY TEST: CPT | Performed by: NURSE PRACTITIONER

## 2025-02-20 PROCEDURE — 58301 REMOVE INTRAUTERINE DEVICE: CPT | Performed by: NURSE PRACTITIONER

## 2025-02-20 PROCEDURE — 58300 INSERT INTRAUTERINE DEVICE: CPT | Performed by: NURSE PRACTITIONER

## 2025-02-20 PROCEDURE — 3079F DIAST BP 80-89 MM HG: CPT | Performed by: NURSE PRACTITIONER

## 2025-02-20 NOTE — PATIENT INSTRUCTIONS
IUD Take-Home Sheet      Progestin IUD (Mirena®, Liletta®, Anusha®, Kyleena®)   It begins working in 7 days to prevent pregnancy.    You MUST use condoms for the first 7 days after your IUD was inserted. If you have sex without using a condom, you will need to take emergency contraception as soon as possible to prevent pregnancy.    Mirena® can stay inside you for 8 years. Anusha® can stay inside you for 3 years. Liletta® can stay inside you for 7 years. Kyleena® can stay inside you for 5 years.   Removal date  ________ (8 years from today)    Today you may go back to school or work after your visit. You must wait 24 hours after your IUD is put in before you can use tampons, take a bath, or have vaginal sex.    You may have more cramps or heavier bleeding with your periods, or spotting between your periods. This is normal. The cramping and bleeding can last for 3-6 months with the Mirena®, Liletta®, and Anusha® (hormone) IUDs. After 6 months, the cramping and bleeding should get better. Many women will stop having periods after 1 or 2 years with the Mirena®, Liletta®, Kyleena®, and Anusha® (hormone) IUDs. If you have the Paragard® (copper) IUD, you may have more cramping and more bleeding with your periods as long as you have the IUD inside you.    Ibuprofen (also called Advil® or Motrin®) helps decrease the bleeding and cramping. You can buy Ibuprofen at any drug store without a prescription. You can take as many as 4 pills (800 mg) of Ibuprofen every 8 hours with food (each pill contains 200 mg). To prevent cramping, start taking Ibuprofen as soon as your period starts and keep taking it every 8 hours for the first 2-3 days of your period. You can also put a hot water bottle on your belly if you have bad cramps.     Your IUD may come out by itself in the first three months. If you can feel the strings, the IUD is in the right place. If your IUD comes out, you can become pregnant immediately. If you are not sure how to  check the strings, we can help you (Our phone number is at the top of this paper.) Meanwhile, use condoms.     The IUD does NOT protect you against sexually transmitted infections. ALWAYS use protection against sexually transmitted infections (male condoms, female condoms, dental dams) if you are at risk. If you think you have been exposed to an STI, discuss testing with your healthcare provider. Most infections can be treated WITHOUT removing your IUD.     WARNING SIGNS:  Within the first 3 weeks of the IUD insertion:  - Fever (>101F)  - Chills  - Strong or sharp pain in your stomach or belly At Any Time (these are very rare):  - Late period (for Paragard® users)  - Feeling pregnant (breast tenderness, nausea, vomiting)  - Positive home pregnancy test  If you develop any of the above warning signs, please call us at [phone]    [facility]  www[facility][email or web address]

## 2025-02-20 NOTE — PROCEDURES
IUD Removal     Pregnancy Results: negative from urine test   Birth control method(s) used:  ; date last used:  mirena IUD  Consent signed.  Procedure discussed with the patient in detail including indication, risks, benefits, alternatives and complications.    Procedure:  Speculum placed in the vagina.  Betadine wash of vagina and cervix.  An Endocervical speculum was used to visualize strings.  An Allis clamp used to grasp IUD strings.    Mirena IUD was removed without difficulty.  The patient tolerated the procedure well.    IUD Insertion     Pregnancy Results: negative from urine test   Birth control method(s) used:  ; date last used:      Consent signed.  Procedure discussed with the patient in detail including indication, risks, benefits, alternatives and complications.    Procedure:  Speculum placed in the vagina.  Betadine wash of vagina and cervix.  Single tooth tenaculum was placed at the 12 o'clock position.  Cervical stenosis encountered. Mini dilators used to dilate cervix.  Uterus sounded to 9 cm.  Mirena IUD was placed without difficulty.  Strings cut at 3 cm.  Single tooth tenaculum removed.  Silver nitrate used to achieve hemostasis.  Good hemostasis noted.    Patient tolerated procedure well.      Visit Plan:  IUD surveillance was discussed with the patient.    Rto 6 weeks for IUD check    MARQUIS Lagos

## 2025-02-21 ENCOUNTER — TELEPHONE (OUTPATIENT)
Age: 45
End: 2025-02-21

## 2025-02-21 NOTE — TELEPHONE ENCOUNTER
Call received from patient requesting an appointment with the [facility]. Introduced myself to patient as Navigator.  Family history reviewed.  Patient meets classic criteria to consider genetic testing.  Offered to schedule appointment for Genetic Counseling.  Patient agreeable.  Scheduled appointment with Veronique Nava for Monday March 10th.

## 2025-03-10 ENCOUNTER — NURSE ONLY (OUTPATIENT)
Age: 45
End: 2025-03-10
Attending: GENETIC COUNSELOR, MS
Payer: MEDICARE

## 2025-03-10 ENCOUNTER — OFFICE VISIT (OUTPATIENT)
Age: 45
End: 2025-03-10
Attending: GENETIC COUNSELOR, MS
Payer: MEDICARE

## 2025-03-10 DIAGNOSIS — Z80.0 FAMILY HISTORY OF MALIGNANT NEOPLASM OF GASTROINTESTINAL TRACT: ICD-10-CM

## 2025-03-10 DIAGNOSIS — Z80.42 FAMILY HISTORY OF MALIGNANT NEOPLASM OF PROSTATE: ICD-10-CM

## 2025-03-10 DIAGNOSIS — Z80.3 FAMILY HISTORY OF MALIGNANT NEOPLASM OF BREAST: Primary | ICD-10-CM

## 2025-03-10 PROBLEM — Z80.41 FAMILY HISTORY OF MALIGNANT NEOPLASM OF OVARY: Status: ACTIVE | Noted: 2025-03-10

## 2025-03-10 PROBLEM — Z80.41 FAMILY HISTORY OF MALIGNANT NEOPLASM OF OVARY: Status: RESOLVED | Noted: 2025-03-10 | Resolved: 2025-03-10

## 2025-03-10 NOTE — PROGRESS NOTES
Reason for visit: Ms. Ch is a 44-year-old woman referred for genetic counseling due to a family history of early-onset breast cancer as well as prostate cancer.  She was seen for genetic counseling and to discuss the option of genetic testing.  Ms. Ch single and is disabled.  Referring MD: MARQUIS Ko  Relevant family history: Ms. Ch shares that her father was diagnosed with prostate cancer at age 70 and recently completed radiation treatments at age 78.  His only sibling, a sister, was diagnosed with breast cancer on two occasions; the first at age 50 and the latter at age 60.  She does not believe that germline testing was performed on any family members.  She also shares that her mother was diagnosed with colon cancer at age 65.  She is otherwise unaware of malignancies on either side of her family.  She is of [place] and [place] heritage on her paternal side of her family and of Lat[place]ian and [place] heritage on her maternal side of her family and denies any consanguinity or Ashkenazi Yarsani heritage.    Medical history: Ms. Ch started her breast imaging at age 42 and is up-to-date with this.  She denies any current breast complaints. She denies any breast biopsies to date.  She was 16 at menarche and is nulliparous.  She has her ovaries and uterus intact.  She is pre-menopausal.  She has had several colonoscopies and reports several small polyps were removed. She denies any current dermatological concerns, thyroid issues or uterine fibroids.  She denies any use of hormone replacement therapy but admits to 26 years of oral contraceptive use.  She admits to former tobacco but denies regular consumption of alcoholic beverages.  She is disabled from migraines and also suffers from fibromyalgia.  Summary:   We discussed hereditary breast cancer with Ms. Ch because of her family history.  Most breast cancer is not hereditary; however, hereditary cancer is suspected under certain conditions, such as  when breast cancer occurs prior to the age of 50 (or premenopausal), when there are multiple affected family members, when breast cancer occurs in combination with other cancers, especially ovarian cancer, and when cancer appears to be passing from generation to generation. Having multiple diagnoses of breast cancer is another suspicious factor We also reviewed how limited family structure can mask families with hereditary cancer predisposition.      We discussed dominant inheritance, the pattern in which most hereditary cancer conditions are inherited.  If an individual has such a cancer predisposing gene mutation, there is a 50% chance that any offspring will not inherit the mutation and a 50% chance that he or she will inherit it.  Inheriting the mutation does not automatically mean that one will develop cancer, rather that there is an increased chance for developing certain types of cancer.  Cancer predisposing gene mutations can exist in males and females and can be passed on to both male and female offspring.  The pros and cons of cancer predisposing gene mutation testing were reviewed with Ms. Ch.  Genetic test results can have significant impact on medical management, planning, screening, surgical decision-making, cancer risk assessment for the patient and relatives, and psychological/emotional well-being.  Mutations in the genes BRCA1 and BRCA2 account for most (but not all) cases of hereditary breast cancer.  Mutations in other genes have also been associated with an increased risk for breast cancer - but mutations in these other genes are statistically less often seen in hereditary breast cancer.    We briefly discussed the benefits and limitations of BRCA1/2 testing versus multi-gene panels that include BRCA1/2.  Panels are an appropriate option for individuals whose history is suggestive of more than one syndrome, and they improve detection rate for identifying the underlying cause of a hereditary  cancer.  Limitations of panels include an unknown percentage of variants of unknown significance, as well as an uncertainty of level of risk associated with certain unknown-penetrance genes, and therefore lack of clear guidelines for risk management of carriers of some of these mutations.  There are panels that assess for mutations in only “high risk” and clinically actionable breast cancer genes as well as larger panels that assess for mutations in high, moderate and unknown-penetrance breast cancer genes.  Genetic testing could yield one of three results: no mutation detected, deleterious mutation detected, or variant of uncertain significance.  The implications of these potential results were reviewed with Ms. Ch.  The optimal testing strategy is to test an affected family member first.  We discussed the limitations of interpreting test results of an unaffected individual.  Specifically, a negative result in an unaffected individual is uninformative unless a known mutation has been identified in an affected relative.  As her paternal aunt has not pursued genetic testing to date, this is not an available option.  Another option is testing Ms. Ch rather than an affected relative, and we reviewed the limitations of this testing, including concerns about discrimination by life insurers, long term healthcare or disability, which are not covered by statutes yet.   Given her family history of an early-onset breast cancer and multiple occurrences of breast cancer on her paternal side well her father with prostate cancer, she does meet NCCN guidelines for genetic testing for HBOC genes.    After discussing the multiple testing options, Ms. Ch decided that she would like to pursue a multi-gene panel which includes both DNA and RNA which also has clinical correlation ([facility] Multi-Cancer Panel+RNA, 70 genes).  The blood was drawn today and sent to [facility].  Turn-around-time is approximately two to three weeks for  the testing.  Our office will call Ms. Ch as soon as results are received; post-test counseling can be scheduled at that time.  Plan:  Ordered [facility] Multi[facility]Cancer Panel+RNA (70 genes) through [facility]  The Genetics office will call Ms. Ch when results are received.  Post-test counseling can be scheduled at that time.  Recommendations for Ms. Ch and family members will depend on above test results.  Thank you for referring Ms. Ch for genetic counseling; please do not hesitate to contact our office if you have any questions or concerns, [phone].  Send to: MARQUIS Ko  Time spent managing patient care: 40 minutes

## 2025-03-17 ENCOUNTER — TELEPHONE (OUTPATIENT)
Age: 45
End: 2025-03-17

## 2025-03-17 NOTE — TELEPHONE ENCOUNTER
Shared NEGATIVE genetic testing results with Taisha over phone. She opted for 70 gene panel+RNA through [facility] ([facility] Multi-Cancer Panel+RNA). Released results to her through lab's portal and will mail her a copy. She was pleased to hear these results and all her questions were answered to the best of my ability.

## 2025-04-03 ENCOUNTER — HOSPITAL ENCOUNTER (OUTPATIENT)
Dept: MAMMOGRAPHY | Facility: HOSPITAL | Age: 45
Discharge: HOME | End: 2025-04-03
Attending: NURSE PRACTITIONER
Payer: MEDICARE

## 2025-04-03 DIAGNOSIS — Z12.31 SCREENING MAMMOGRAM FOR BREAST CANCER: ICD-10-CM

## 2025-04-03 DIAGNOSIS — Z80.3 FAMILY HISTORY OF BREAST CANCER: ICD-10-CM

## 2025-04-03 PROCEDURE — 77067 SCR MAMMO BI INCL CAD: CPT | Performed by: NURSE PRACTITIONER

## 2025-04-03 PROCEDURE — 77063 BREAST TOMOSYNTHESIS BI: CPT | Performed by: NURSE PRACTITIONER

## 2025-04-07 ENCOUNTER — OFFICE VISIT (OUTPATIENT)
Dept: OBGYN CLINIC | Facility: CLINIC | Age: 45
End: 2025-04-07
Payer: MEDICARE

## 2025-04-07 VITALS
SYSTOLIC BLOOD PRESSURE: 136 MMHG | BODY MASS INDEX: 45 KG/M2 | DIASTOLIC BLOOD PRESSURE: 86 MMHG | HEART RATE: 64 BPM | WEIGHT: 246 LBS

## 2025-04-07 DIAGNOSIS — Z97.5 PRESENCE OF IUD: Primary | ICD-10-CM

## 2025-04-07 PROCEDURE — 3079F DIAST BP 80-89 MM HG: CPT | Performed by: NURSE PRACTITIONER

## 2025-04-07 PROCEDURE — 3075F SYST BP GE 130 - 139MM HG: CPT | Performed by: NURSE PRACTITIONER

## 2025-04-07 PROCEDURE — 99212 OFFICE O/P EST SF 10 MIN: CPT | Performed by: NURSE PRACTITIONER

## 2025-04-07 RX ORDER — ARIPIPRAZOLE 2 MG/1
TABLET ORAL
COMMUNITY
Start: 2025-03-20

## 2025-04-07 NOTE — PROGRESS NOTES
[facility]   Obstetrics and Gynecology    Taisha Ch is a 44 year old female  No LMP recorded. (Menstrual status: IUD - Intrauterine Device).   Chief Complaint   Patient presents with    Follow - Up     IUD follow up    .   History of Present Illness    Seen on 2025 for mirena replacement. No issues since last visit. No pelvic pain or abnormal discharge or odor.     Pap:3/2024 NILM, negative human papillomavirus with pCP - report brought with her.   per patient  hx of leep in 2011 with normal paps since       Contraception:mirena IUD since   Mammo: 2025 normal    OBSTETRICS HISTORY:  OB History    Para Term  AB Living   0 0 0 0 0 0   SAB IAB Ectopic Multiple Live Births   0 0 0 0 0       GYNE HISTORY:  Menarche: 16 YEARS OLD (2025 11:35 AM)  Period Cycle (Days): NO PERIOD WITH IUD (2025 11:35 AM)  Use of Birth Control (if yes, specify type): Mirena IUD (2025 11:35 AM)  Pap Date: 24 (2025  1:19 PM)  Pap Result Notes: PAP/HPV NEG (2025  1:19 PM)  Follow Up Recommendation: Mammo 4/3/25 Diag C1-Neg // Annual 25 EMB (2025 11:35 AM)      History   Sexual Activity    Sexual activity: Not on file       MEDICAL HISTORY:  Past Medical History:    Anxiety    Asthma (HCC)    When younger, exercise induced    BRCA gene mutation negative in female    Negative for 70 gene panel+RNA. Results in media tab    Depression    Esophageal reflux    Essential hypertension    Fibromyalgia    Herniated cervical intervertebral disc    s/p C5/6 surgery    Herniated lumbar intervertebral disc    High blood pressure    Migraine with aura    Migraines    Sleep apnea    Sleep disorder    Sleep disorder    Tachycardia       SOCIAL HISTORY:  Social History     Socioeconomic History    Marital status:      Spouse name: Not on file    Number of children: Not on file    Years of education: Not on file    Highest education level: Not on file    Occupational History    Not on file   Tobacco Use    Smoking status: Former     Current packs/day: 0.00     Types: Cigarettes     Quit date: 2016     Years since quittin.8    Smokeless tobacco: Never    Tobacco comments:     Quit   Vaping Use    Vaping status: Never Used   Substance and Sexual Activity    Alcohol use: No     Alcohol/week: 0.0 standard drinks of alcohol    Drug use: No    Sexual activity: Not on file   Other Topics Concern     Service Not Asked    Blood Transfusions Not Asked    Caffeine Concern Yes     Comment: 1 daily    Occupational Exposure Not Asked    Hobby Hazards Not Asked    Sleep Concern Not Asked    Stress Concern Not Asked    Weight Concern Not Asked    Special Diet Not Asked    Back Care Not Asked    Exercise Not Asked     Comment: 2 times week walk,stretching    Bike Helmet Not Asked    Seat Belt Not Asked    Self-Exams Not Asked    Grew up on a farm Not Asked    History of tanning Not Asked    Outdoor occupation Not Asked    Breast feeding Not Asked    Reaction to local anesthetic No    Pt has a pacemaker No    Pt has a defibrillator No   Social History Narrative    The patient does not use an assistive device..      The patient does live in a home with stairs.     Social Drivers of Health     Food Insecurity: Not on file   Transportation Needs: Not on file   Stress: Not on file   Housing Stability: Not on file       MEDICATIONS:    Current Outpatient Medications:     ARIPiprazole 2 MG Oral Tab, , Disp: , Rfl:     cetirizine 10 MG Oral Tab, Take 1 tablet (10 mg total) by mouth daily., Disp: , Rfl:     Omeprazole 40 MG Oral Capsule Delayed Release, Take 1 capsule (40 mg total) by mouth daily., Disp: 30 capsule, Rfl: 11    Clindamycin Phosphate 1 % External Swab, , Disp: , Rfl:     tiZANidine 4 MG Oral Tab, Take 4 tablets (16 mg total) by mouth nightly., Disp: , Rfl:     levETIRAcetam 250 MG Oral Tab, Take 1 tablet (250 mg total) by mouth 2 (two) times daily as needed.,  Disp: , Rfl:     haloperidol 5 MG Oral Tab, , Disp: , Rfl:     BD LUER-JIMENEZ SYRINGE 23G X 1\" 3 ML Does not apply Misc, , Disp: , Rfl:     nystatin-triamcinolone 100,000-0.1 Units/g-% External Ointment, Apply twice daily  Monday-Friday to affected areas of rash in armpits. Use only with flares., Disp: 60 g, Rfl: 1    gabapentin 400 MG Oral Cap, Take 600 mg by mouth daily., Disp: , Rfl:     nebivolol 5 MG Oral Tab, , Disp: , Rfl:     Nebivolol HCl 20 MG Oral Tab, , Disp: , Rfl:     hydrOXYzine 25 MG Oral Tab, Take 1 tablet (25 mg total) by mouth nightly. Taking half tab (12.5mg), Disp: , Rfl:     Montelukast Sodium 10 MG Oral Tab, Take 1 tablet (10 mg total) by mouth nightly., Disp: , Rfl:     Benztropine Mesylate 1 MG Oral Tab, Take 1 tablet (1 mg total) by mouth., Disp: , Rfl:     Cholecalciferol (VITAMIN D3) 10 MCG (400 UNIT) Oral Cap, takes 1000 units daily, Disp: , Rfl:     Vitamin B-12 250 MCG Oral Tab, Take by mouth., Disp: , Rfl:     diphenhydrAMINE HCl 50 MG/ML Injection Solution, , Disp: , Rfl:     Galcanezumab-gnlm 120 MG/ML Subcutaneous Solution Auto-injector, , Disp: , Rfl:     LORazepam 1 MG Oral Tab, Take two tablet by mouth twice daily as needed for severe headache. Take with Haldol/Cogentin. Limit 20/month., Disp: , Rfl:     Aspirin-Acetaminophen-Caffeine (EXCEDRIN MIGRAINE OR), Take by mouth as needed.  , Disp: , Rfl:     PARoxetine 10 MG Oral Tab, , Disp: , Rfl:     ketoconazole 2 % External Cream, , Disp: , Rfl:     traMADol 50 MG Oral Tab, , Disp: , Rfl:     EPINEPHrine (EPIPEN 2-FERNANDO) 0.3 MG/0.3ML Injection Solution Auto-injector, Inject IM in event of  allergic reaction, Disp: 1 each, Rfl: 0    Vitamin K, Phytonadione, 100 MCG Oral Tab, Take by mouth., Disp: , Rfl:     Venlafaxine HCl  MG Oral Capsule SR 24 Hr, Take 1 capsule (150 mg total) by mouth., Disp: , Rfl:     ALLERGIES:  Allergies[1]      Review of Systems:  Constitutional:  Denies fatigue, night sweats, hot flashes  Cardiovascular:   denies chest pain or palpitations  Respiratory:  denies shortness of breath  Gastrointestinal:  denies heartburn, abdominal pain, diarrhea or constipation  Genitourinary:  denies dysuria, incontinence, abnormal vaginal discharge, vaginal itching   Musculoskeletal:  denies back pain.  Skin/Breast:  Denies any breast pain, lumps, or discharge.   Neurological:  denies headaches, extremity weakness or numbness.  Psychiatric: denies depression or anxiety.  Endocrine:   denies excessive thirst or urination.  Heme/Lymph:  denies history of anemia, easy bruising or bleeding.      PHYSICAL EXAM:     Vitals:    04/07/25 1152   BP: 136/86   Pulse: 64   Weight: 246 lb (111.6 kg)     Body mass index is 44.99 kg/m².     Patient offered chaperone, patient declined    Constitutional: well developed, well nourished    Psychiatric:  Oriented to time, place, person and situation. Appropriate mood and affect    Pelvic Exam:  External Genitalia: normal appearance, hair distribution, and no lesions  Urethral Meatus:  normal in size, location, without lesions and prolapse  Bladder:  No fullness, masses or tenderness  Vagina:  Normal appearance without lesions, no abnormal discharge  Cervix:  +IUD strings,Normal without tenderness on motion  Uterus: normal in size, contour, position, mobility, without tenderness  Adnexa: normal without masses or tenderness  Perineum: normal  Anus: no hemorroids   Lymph node: no inguinal lymph nodes    Results      Assessment & Plan:    ICD-10-CM    1. Presence of IUD  Z97.5         IUD appears in situ  Follow up in 2/2026 for annual or sooner if concerns.  Assessment & Plan      MARQUIS Lagos        This note was prepared using Dragon Medical voice recognition dictation software. As a result errors may occur. When identified these errors have been corrected. While every attempt is made to correct errors during dictation discrepancies may still exist.         [1]   Allergies  Allergen Reactions     Ergotamine-Pb-Belladonna HIVES     Allergic to Bellergan    Fremanezumab-Vfrm SWELLING    Shellfish-Derived Products ANAPHYLAXIS    Coconut Fatty Acids RASH     Rash if eats coconut,cocunut oil is ok    Mold     Pollen      Sinus congestion    Seafood HIVES    Belladonna RASH     Bellergal reaction generalized rash    Coconut Flavor RASH    Fish Oil RASH

## 2025-04-07 NOTE — PROGRESS NOTES
The following individual(s) verbally consented to be recorded using ambient AI listening technology and understand that they can each withdraw their consent to this listening technology at any point by asking the clinician to turn off or pause the recording:    Patient name: Taisha Ch  Additional names:

## 2025-06-18 ENCOUNTER — OFFICE VISIT (OUTPATIENT)
Dept: DERMATOLOGY CLINIC | Facility: CLINIC | Age: 45
End: 2025-06-18
Payer: MEDICARE

## 2025-06-18 DIAGNOSIS — L91.8 INFLAMED ACROCHORDON: ICD-10-CM

## 2025-06-18 DIAGNOSIS — L30.4 INTERTRIGO: Primary | ICD-10-CM

## 2025-06-18 DIAGNOSIS — L73.2 HIDRADENITIS SUPPURATIVA: ICD-10-CM

## 2025-06-18 PROCEDURE — 99214 OFFICE O/P EST MOD 30 MIN: CPT | Performed by: STUDENT IN AN ORGANIZED HEALTH CARE EDUCATION/TRAINING PROGRAM

## 2025-06-18 RX ORDER — CLINDAMYCIN PHOSPHATE 10 MG/ML
SOLUTION TOPICAL
Refills: 0 | Status: CANCELLED | OUTPATIENT
Start: 2025-06-18

## 2025-06-18 RX ORDER — ONDANSETRON 4 MG/1
TABLET, FILM COATED ORAL
COMMUNITY
Start: 2025-04-25

## 2025-06-18 RX ORDER — CLINDAMYCIN PHOSPHATE 10 MG/ML
1 SOLUTION TOPICAL 2 TIMES DAILY
Qty: 60 EACH | Refills: 11 | Status: SHIPPED | OUTPATIENT
Start: 2025-06-18 | End: 2025-07-18

## 2025-06-18 RX ORDER — LEVONORGESTREL 52 MG/1
1 INTRAUTERINE DEVICE INTRAUTERINE ONCE
COMMUNITY

## 2025-06-18 RX ORDER — NAPROXEN 500 MG/1
500 TABLET ORAL 2 TIMES DAILY
COMMUNITY

## 2025-06-18 RX ORDER — NYSTATIN AND TRIAMCINOLONE ACETONIDE 100000; 1 [USP'U]/G; MG/G
OINTMENT TOPICAL
Qty: 60 G | Refills: 1 | Status: SHIPPED | OUTPATIENT
Start: 2025-06-18

## 2025-06-18 RX ORDER — BACLOFEN 10 MG/1
TABLET ORAL
COMMUNITY
Start: 2025-03-03

## 2025-06-18 RX ORDER — TIRZEPATIDE 7.5 MG/.5ML
7.5 INJECTION, SOLUTION SUBCUTANEOUS WEEKLY
COMMUNITY
Start: 2025-06-11

## 2025-06-18 RX ORDER — NYSTATIN 100000 [USP'U]/G
POWDER TOPICAL
Qty: 60 G | Refills: 6 | Status: SHIPPED | OUTPATIENT
Start: 2025-06-18

## 2025-06-18 NOTE — PROGRESS NOTES
Established Patient      CHIEF COMPLAINT: HS f/u    HISTORY OF PRESENT ILLNESS: Taisha Ch is a 44 year old female here for evaluation of lesion of concern.    1. Growth   Location: Under breasys  Duration: Unknown   Signs and symptoms: Pt reports stability not much improvement   Current treatment: Clindamycin phosphate, nystatin triamcinolone  Past treatments: None     Personal Dermatologic History  History of skin cancer: No  History of  atypical moles: Yes    FAMILY HISTORY:  History of melanoma: Yes (Maternal Grandfather)     Past Medical History  Past Medical History:    Anxiety    Asthma (HCC)    When younger, exercise induced    BRCA gene mutation negative in female    Negative for 70 gene panel+RNA. Results in media tab    Depression    Esophageal reflux    Essential hypertension    Fibromyalgia    Herniated cervical intervertebral disc    s/p C5/6 surgery    Herniated lumbar intervertebral disc    High blood pressure    Migraine with aura    Migraines    Sleep apnea    Sleep disorder    Sleep disorder    Tachycardia       REVIEW OF SYSTEMS:  Constitutional: Denies fever, chills, unintentional weight loss.   Skin as per HPI    Medications  Current Outpatient Medications   Medication Sig Dispense Refill    ARIPiprazole 2 MG Oral Tab       cetirizine 10 MG Oral Tab Take 1 tablet (10 mg total) by mouth daily.      Omeprazole 40 MG Oral Capsule Delayed Release Take 1 capsule (40 mg total) by mouth daily. 30 capsule 11    PARoxetine 10 MG Oral Tab       Clindamycin Phosphate 1 % External Swab       tiZANidine 4 MG Oral Tab Take 4 tablets (16 mg total) by mouth nightly.      levETIRAcetam 250 MG Oral Tab Take 1 tablet (250 mg total) by mouth 2 (two) times daily as needed.      haloperidol 5 MG Oral Tab       ketoconazole 2 % External Cream       BD LUER-JIMENEZ SYRINGE 23G X 1\" 3 ML Does not apply Misc       traMADol 50 MG Oral Tab  (Patient not taking: Reported on 4/7/2025)      nystatin-triamcinolone  100,000-0.1 Units/g-% External Ointment Apply twice daily  Monday-Friday to affected areas of rash in armpits. Use only with flares. 60 g 1    gabapentin 400 MG Oral Cap Take 600 mg by mouth daily.      nebivolol 5 MG Oral Tab       Nebivolol HCl 20 MG Oral Tab       hydrOXYzine 25 MG Oral Tab Take 1 tablet (25 mg total) by mouth nightly. Taking half tab (12.5mg)      EPINEPHrine (EPIPEN 2-FERNANDO) 0.3 MG/0.3ML Injection Solution Auto-injector Inject IM in event of  allergic reaction 1 each 0    Montelukast Sodium 10 MG Oral Tab Take 1 tablet (10 mg total) by mouth nightly.      Vitamin K, Phytonadione, 100 MCG Oral Tab Take by mouth.      Benztropine Mesylate 1 MG Oral Tab Take 1 tablet (1 mg total) by mouth.      Cholecalciferol (VITAMIN D3) 10 MCG (400 UNIT) Oral Cap takes 1000 units daily      Vitamin B-12 250 MCG Oral Tab Take by mouth.      diphenhydrAMINE HCl 50 MG/ML Injection Solution       Galcanezumab-gnlm 120 MG/ML Subcutaneous Solution Auto-injector       LORazepam 1 MG Oral Tab Take two tablet by mouth twice daily as needed for severe headache. Take with Haldol/Cogentin. Limit 20/month.      Venlafaxine HCl  MG Oral Capsule SR 24 Hr Take 1 capsule (150 mg total) by mouth.      Aspirin-Acetaminophen-Caffeine (EXCEDRIN MIGRAINE OR) Take by mouth as needed.           PHYSICAL EXAM:  General: awake, alert, no acute distress  Neuropsych: appropriate mood and affect  Eyes: Sclerae anicteric, without conjunctival injection, eyelids unremarkable  Skin: Skin exam was performed today including the following: axillae and inframammary cleft. Pertinent findings include:   - axillae with well defined erythematous plaques   - L axilla with a pedunculated papule  - L areola with a brown stuck on papule     ASSESSMENT & PLAN:  Pathophysiology of diagnoses discussed with patient.  Therapeutic options reviewed. Risks, benefits, and alternatives discussed with patient. Instructions reviewed at length.    #Seborrheic  keratosis   - Reassured patient regarding benign nature of lesion. No treatment but observation at this time. Follow-up for concerning physical changes or new symptoms.     #Hidradenitis Suppurativa   - Continue clindamycin 1% Swab twice daily to affected areas with flares. Use benzoyl peroxide wash once daily while using this medication. Purchase an over the counter acne wash containing 4-10% benzoyl peroxide. (Examples: Cerave Acne Wash, Cerave Acne Foaming Cream Cleanser, PanOxyl). If you have difficulty finding one, ask your pharmacist for assistance. Use to affected areas. Be sure to rinse thoroughly, as medication may bleach clothing, towels and hair.    #Intertrigo, continues to flare with sweating  - Discussed that intertrigo is irritation of touching skin surfaces in body fold regions (armpits, under the breasts, belly, buttocks, groin). Intertrigo can be worsened by any conditions causing increased heat, wetness, and friction. Intertrigo may be complicated by superficial skin infection with yeast. It can affect people of all ages but is more frequently seen in overweight people, diabetics, people spending a lot of time in bed, diaper users, or anyone with incontinence problems.   - Start nystatin powder twice daily    - nystatin-tac twice daily with flares     #Acrochordons, inflamed   - Skin tags are in locations where friction from clothing, jewelry, shaving cause pain, frequent trauma, bleeding occur and patient requests removal. This is a medically necessary procedure.   - Cautery, low, blunt tip at 3.0 to lesions on L axilla   Risks (including, but not limited to scarring, pain, bleeding, infection, dyschromia) benefits, alternatives and personnel discussed with patient who consents to proceed. Vaseline applied after      Return to clinic: as needed or sooner if something concerning arises     Taiwo Duckworth MD    By signing my name below, I, Abril GONZALEZ MA,  attest that this documentation has been  prepared under the direction and in the presence of Taiwo Duckworth MD.   Electronically Signed: Abril GONZALEZ MA, 6/18/2025, 4:11 PM.    I, Taiwo Duckworth MD,  personally performed the services described in this documentation. All medical record entries made by the scribe were at my direction and in my presence.  I have reviewed the chart and agree that the record reflects my personal performance and is accurate and complete.  Taiwo Duckworth MD, 6/18/2025, 4:34 PM'

## (undated) DEVICE — KIT CLEAN ENDOKIT 1.1OZ GOWNX2

## (undated) DEVICE — YANKAUER,BULB TIP,W/O VENT,RIGID,STERILE: Brand: MEDLINE

## (undated) DEVICE — MEDI-VAC NON-CONDUCTIVE SUCTION TUBING: Brand: CARDINAL HEALTH

## (undated) DEVICE — 35 ML SYRINGE REGULAR TIP: Brand: MONOJECT

## (undated) DEVICE — CONMED SCOPE SAVER BITE BLOCK, 20X27 MM: Brand: SCOPE SAVER

## (undated) DEVICE — CO2 CANNULA,SSOFT,ADLT,7O2,4CO2,FEMALE: Brand: MEDLINE

## (undated) DEVICE — 6 ML SYRINGE LUER-LOCK TIP: Brand: MONOJECT

## (undated) DEVICE — SNARE OPTMZ PLPCTM TRP

## (undated) DEVICE — GIJAW SINGLE-USE BIOPSY FORCEPS WITH NEEDLE: Brand: GIJAW

## (undated) DEVICE — LINE MNTR ADLT SET O2 INTMD

## (undated) DEVICE — KIT ENDO ORCAPOD 160/180/190

## (undated) DEVICE — FORCEP RADIAL JAW 4

## (undated) DEVICE — MEDI-VAC NON-CONDUCTIVE SUCTION TUBING 6MM X 1.8M (6FT.) L: Brand: CARDINAL HEALTH

## (undated) DEVICE — SYRINGE, LUER SLIP, STERILE, 60ML: Brand: MEDLINE

## (undated) DEVICE — V2 SPECIMEN COLLECTION MANIFOLD KIT: Brand: NEPTUNE

## (undated) DEVICE — 3 ML SYRINGE LUER-LOCK TIP: Brand: MONOJECT

## (undated) DEVICE — LASSO POLYPECTOMY SNARE: Brand: LASSO

## (undated) DEVICE — Device: Brand: CUSTOM PROCEDURE KIT

## (undated) DEVICE — 60 ML SYRINGE REGULAR TIP: Brand: MONOJECT

## (undated) DEVICE — Device: Brand: DEFENDO AIR/WATER/SUCTION AND BIOPSY VALVE

## (undated) DEVICE — Device: Brand: DUAL NARE NASAL CANNULAE FEMALE LUER CON 7FT O2 TUBE

## (undated) NOTE — LETTER
[facility]  [address], [place], [place]    Authorization for Surgical Operation and Procedure                               I hereby authorize Mark Lindo MD, my physician and his/her assistants (if applicable), which may include medical students, residents, and/or fellows, to perform the following surgical operation/ procedure and administer such anesthesia as may be determined necessary by my physician: Operation/Procedure name (s) ESOPHAGOGASTRODUODENOSCOPY on Taisha Ch   2.   I recognize that during the surgical operation/procedure, unforeseen conditions may necessitate additional or different procedures than those listed above.  I, therefore, further authorize and request that the above-named surgeon, assistants, or designees perform such procedures as are, in their judgment, necessary and desirable.    3.   My surgeon/physician has discussed prior to my surgery the potential benefits, risks and side effects of this procedure; the likelihood of achieving goals; and potential problems that might occur during recuperation.  They also discussed reasonable alternatives to the procedure, including risks, benefits, and side effects related to the alternatives and risks related to not receiving this procedure.  I have had all my questions answered and I acknowledge that no guarantee has been made as to the result that may be obtained.    4.   Should the need arise during my operation/procedure, which includes change of level of care prior to discharge, I also consent to the administration of blood and/or blood products.  Further, I understand that despite careful testing and screening of blood or blood products by collecting agencies, I may still be subject to ill effects as a result of receiving a blood transfusion and/or blood products.  The following are some, but not all, of the potential risks that can occur: fever and allergic reactions, hemolytic reactions, transmission  of diseases such as Hepatitis, AIDS and Cytomegalovirus (CMV) and fluid overload.  In the event that I wish to have an autologous transfusion of my own blood, or a directed donor transfusion, I will discuss this with my physician.  Check only if Refusing Blood or Blood Products  I understand refusal of blood or blood products as deemed necessary by my physician may have serious consequences to my condition to include possible death. I hereby assume responsibility for my refusal and release the hospital, its personnel, and my physicians from any responsibility for the consequences of my refusal.    o  Refuse   5.   I authorize the use of any specimen, organs, tissues, body parts or foreign objects that may be removed from my body during the operation/procedure for diagnosis, research or teaching purposes and their subsequent disposal by hospital authorities.  I also authorize the release of specimen test results and/or written reports to my treating physician on the hospital medical staff or other referring or consulting physicians involved in my care, at the discretion of the Pathologist or my treating physician.    6.   I consent to the photographing or videotaping of the operations or procedures to be performed, including appropriate portions of my body for medical, scientific, or educational purposes, provided my identity is not revealed by the pictures or by descriptive texts accompanying them.  If the procedure has been photographed/videotaped, the surgeon will obtain the original picture, image, videotape or CD.  The hospital will not be responsible for storage, release or maintenance of the picture, image, tape or CD.    7.   I consent to the presence of a  or observers in the operating room as deemed necessary by my physician or their designees.    8.   I recognize that in the event my procedure results in extended X-Ray/fluoroscopy time, I may develop a skin reaction.    9. If I have a Do  Not Attempt Resuscitation (DNAR) order in place, that status will be suspended while in the operating room, procedural suite, and during the recovery period unless otherwise explicitly stated by me (or a person authorized to consent on my behalf). The surgeon or my attending physician will determine when the applicable recovery period ends for purposes of reinstating the DNAR order.  10. Patients having a sterilization procedure: I understand that if the procedure is successful the results will be permanent and it will therefore be impossible for me to inseminate, conceive, or bear children.  I also understand that the procedure is intended to result in sterility, although the result has not been guaranteed.   11. I acknowledge that my physician has explained sedation/analgesia administration to me including the risk and benefits I consent to the administration of sedation/analgesia as may be necessary or desirable in the judgment of my physician.    I CERTIFY THAT I HAVE READ AND FULLY UNDERSTAND THE ABOVE CONSENT TO OPERATION and/or OTHER PROCEDURE.     ____________________________________  _________________________________        ______________________________  Signature of Patient    Signature of Responsible Person                Printed Name of Responsible Person                                      ____________________________________  _____________________________                ________________________________  Signature of Witness        Date  Time         Relationship to Patient    STATEMENT OF PHYSICIAN My signature below affirms that prior to the time of the procedure; I have explained to the patient and/or his/her legal representative, the risks and benefits involved in the proposed treatment and any reasonable alternative to the proposed treatment. I have also explained the risks and benefits involved in refusal of the proposed treatment and alternatives to the proposed treatment and have answered the  patient's questions. If I have a significant financial interest in a co-management agreement or a significant financial interest in any product or implant, or other significant relationship used in this procedure/surgery, I have disclosed this and had a discussion with my patient.     _____________________________________________________              _____________________________  (Signature of Physician)                                                                                         (Date)                                   (Time)  Patient Name: Taisha Deluca Jing      : 10/5/1980      Printed: 2024     Medical Record #: [id number]                                      Page 1 of 1

## (undated) NOTE — MR AVS SNAPSHOT
[facility]  [address], [address]  [address] [id number]               Thank you for choosing us for your health care visit with Loreto Pitt MD.  We are glad to serve you and happy to provide you with this summary of your metoprolol Tartrate 25 MG Tabs   Take 25 mg by mouth 2 (two) times daily. Commonly known as:  LOPRESSOR           omeprazole 20 MG Cpdr   Take 40 mg by mouth every morning.    Commonly known as:  PRILOSEC           pregabalin 75 MG Caps   Take 75

## (undated) NOTE — LETTER
[facility]  Administration of Anesthesia  I, Taisha Ch agree to be cared for by a physician anesthesiologist alone and/or with a nurse anesthetist, who is specially trained to monitor me and give me medicine to put me to sleep or keep me comfortable during my procedure    I understand that my anesthesiologist and/or anesthetist is not an employee or agent of [facility] or [employer]. He or she works for [facility]    As the patient asking for anesthesia services, I agree to:  Allow the anesthesiologist (anesthesia doctor) to give me medicine and do additional procedures as necessary. Some examples are: Starting or using an “IV” to give me medicine, fluids or blood during my procedure, and having a breathing tube placed to help me breathe when I’m asleep (intubation). In the event that my heart stops working properly, I understand that my anesthesiologist will make every effort to sustain my life, unless otherwise directed by [facility] Do Not Resuscitate documents.  Tell my anesthesia doctor before my procedure:  If I am pregnant.  The last time that I ate or drank.  iii. All of the medicines I take (including prescriptions, herbal supplements, and pills I can buy without a prescription (including street drugs/illegal medications). Failure to inform my anesthesiologist about these medicines may increase my risk of anesthetic complications.  iv.If I am allergic to anything or have had a reaction to anesthesia before.  I understand how the anesthesia medicine will help me (benefits).  I understand that with any type of anesthesia medicine there are risks:  The most common risks are: nausea, vomiting, sore throat, muscle soreness, damage to my eyes, mouth, or teeth (from breathing tube placement).  Rare risks include: remembering what happened during my procedure, allergic reactions to medications, injury to my airway, heart, lungs, vision, nerves, or  muscles and in extremely rare instances death.  My doctor has explained to me other choices available to me for my care (alternatives).  Pregnant Patients (“epidural”):  I understand that the risks of having an epidural (medicine given into my back to help control pain during labor), include itching, low blood pressure, difficulty urinating, headache or slowing of the baby’s heart. Very rare risks include infection, bleeding, seizure, irregular heart rhythms and nerve injury.  Regional Anesthesia (“spinal”, “epidural”, & “nerve blocks”):  I understand that rare but potential complications include headache, bleeding, infection, seizure, irregular heart rhythms, and nerve injury.    _____________________________________________________________________________  Patient (or Representative) Signature/Relationship to Patient  Date   Time    _____________________________________________________________________________   Name (if used)    Language/Organization   Time    _____________________________________________________________________________  Nurse Anesthetist Signature     Date   Time  _____________________________________________________________________________  Anesthesiologist Signature     Date   Time  I have discussed the procedure and information above with the patient (or patient’s representative) and answered their questions. The patient or their representative has agreed to have anesthesia services.    _____________________________________________________________________________  Witness        Date   Time  I have verified that the signature is that of the patient or patient’s representative, and that it was signed before the procedure  Patient Name: Taisha Ch     : 10/5/1980                 Printed: 3/4/2024 at 2:16 PM    Medical Record #: [id number]                                            Page 1 of 1  ----------ANESTHESIA CONSENT----------

## (undated) NOTE — LETTER
[facility]  Administration of Anesthesia  I, Taisha Ch agree to be cared for by a physician anesthesiologist alone and/or with a nurse anesthetist, who is specially trained to monitor me and give me medicine to put me to sleep or keep me comfortable during my procedure    I understand that my anesthesiologist and/or anesthetist is not an employee or agent of [facility] or [employer]. He or she works for [facility]    As the patient asking for anesthesia services, I agree to:  Allow the anesthesiologist (anesthesia doctor) to give me medicine and do additional procedures as necessary. Some examples are: Starting or using an “IV” to give me medicine, fluids or blood during my procedure, and having a breathing tube placed to help me breathe when I’m asleep (intubation). In the event that my heart stops working properly, I understand that my anesthesiologist will make every effort to sustain my life, unless otherwise directed by [facility] Do Not Resuscitate documents.  Tell my anesthesia doctor before my procedure:  If I am pregnant.  The last time that I ate or drank.  iii. All of the medicines I take (including prescriptions, herbal supplements, and pills I can buy without a prescription (including street drugs/illegal medications). Failure to inform my anesthesiologist about these medicines may increase my risk of anesthetic complications.  iv.If I am allergic to anything or have had a reaction to anesthesia before.  I understand how the anesthesia medicine will help me (benefits).  I understand that with any type of anesthesia medicine there are risks:  The most common risks are: nausea, vomiting, sore throat, muscle soreness, damage to my eyes, mouth, or teeth (from breathing tube placement).  Rare risks include: remembering what happened during my procedure, allergic reactions to medications, injury to my airway, heart, lungs, vision, nerves, or  muscles and in extremely rare instances death.  My doctor has explained to me other choices available to me for my care (alternatives).  Pregnant Patients (“epidural”):  I understand that the risks of having an epidural (medicine given into my back to help control pain during labor), include itching, low blood pressure, difficulty urinating, headache or slowing of the baby’s heart. Very rare risks include infection, bleeding, seizure, irregular heart rhythms and nerve injury.  Regional Anesthesia (“spinal”, “epidural”, & “nerve blocks”):  I understand that rare but potential complications include headache, bleeding, infection, seizure, irregular heart rhythms, and nerve injury.    _____________________________________________________________________________  Patient (or Representative) Signature/Relationship to Patient  Date   Time    _____________________________________________________________________________   Name (if used)    Language/Organization   Time    _____________________________________________________________________________  Nurse Anesthetist Signature     Date   Time  _____________________________________________________________________________  Anesthesiologist Signature     Date   Time  I have discussed the procedure and information above with the patient (or patient’s representative) and answered their questions. The patient or their representative has agreed to have anesthesia services.    _____________________________________________________________________________  Witness        Date   Time  I have verified that the signature is that of the patient or patient’s representative, and that it was signed before the procedure  Patient Name: Taisha Ch     : 10/5/1980                 Printed: 2024 at 3:19 PM    Medical Record #: [id number]                                            Page 1 of 1  ----------ANESTHESIA CONSENT----------

## (undated) NOTE — LETTER
[facility]  [address], [place], [place]  Authorization for Surgical Operation and Procedure                                                                                           I hereby authorize Mark Lindo MD, my physician and his/her assistants (if applicable), which may include medical students, residents, and/or fellows, to perform the following surgical operation/ procedure and administer such anesthesia as may be determined necessary by my physician: Operation/Procedure name (s) COLONOSCOPY on Taisha Deluca Ch   2.   I recognize that during the surgical operation/procedure, unforeseen conditions may necessitate additional or different procedures than those listed above.  I, therefore, further authorize and request that the above-named surgeon, assistants, or designees perform such procedures as are, in their judgment, necessary and desirable.    3.   My surgeon/physician has discussed prior to my surgery the potential benefits, risks and side effects of this procedure; the likelihood of achieving goals; and potential problems that might occur during recuperation.  They also discussed reasonable alternatives to the procedure, including risks, benefits, and side effects related to the alternatives and risks related to not receiving this procedure.  I have had all my questions answered and I acknowledge that no guarantee has been made as to the result that may be obtained.    4.   Should the need arise during my operation/procedure, which includes change of level of care prior to discharge, I also consent to the administration of blood and/or blood products.  Further, I understand that despite careful testing and screening of blood or blood products by collecting agencies, I may still be subject to ill effects as a result of receiving a blood transfusion and/or blood products.  The following are some, but not all, of the potential risks that can occur: fever and allergic  reactions, hemolytic reactions, transmission of diseases such as Hepatitis, AIDS and Cytomegalovirus (CMV) and fluid overload.  In the event that I wish to have an autologous transfusion of my own blood, or a directed donor transfusion, I will discuss this with my physician.  Check only if Refusing Blood or Blood Products  I understand refusal of blood or blood products as deemed necessary by my physician may have serious consequences to my condition to include possible death. I hereby assume responsibility for my refusal and release the hospital, its personnel, and my physicians from any responsibility for the consequences of my refusal.    o  Refuse   5.   I authorize the use of any specimen, organs, tissues, body parts or foreign objects that may be removed from my body during the operation/procedure for diagnosis, research or teaching purposes and their subsequent disposal by hospital authorities.  I also authorize the release of specimen test results and/or written reports to my treating physician on the hospital medical staff or other referring or consulting physicians involved in my care, at the discretion of the Pathologist or my treating physician.    6.   I consent to the photographing or videotaping of the operations or procedures to be performed, including appropriate portions of my body for medical, scientific, or educational purposes, provided my identity is not revealed by the pictures or by descriptive texts accompanying them.  If the procedure has been photographed/videotaped, the surgeon will obtain the original picture, image, videotape or CD.  The hospital will not be responsible for storage, release or maintenance of the picture, image, tape or CD.    7.   I consent to the presence of a  or observers in the operating room as deemed necessary by my physician or their designees.    8.   I recognize that in the event my procedure results in extended X-Ray/fluoroscopy time, I may  develop a skin reaction.    9. If I have a Do Not Attempt Resuscitation (DNAR) order in place, that status will be suspended while in the operating room, procedural suite, and during the recovery period unless otherwise explicitly stated by me (or a person authorized to consent on my behalf). The surgeon or my attending physician will determine when the applicable recovery period ends for purposes of reinstating the DNAR order.  10. Patients having a sterilization procedure: I understand that if the procedure is successful the results will be permanent and it will therefore be impossible for me to inseminate, conceive, or bear children.  I also understand that the procedure is intended to result in sterility, although the result has not been guaranteed.   11. I acknowledge that my physician has explained sedation/analgesia administration to me including the risk and benefits I consent to the administration of sedation/analgesia as may be necessary or desirable in the judgment of my physician.    I CERTIFY THAT I HAVE READ AND FULLY UNDERSTAND THE ABOVE CONSENT TO OPERATION and/or OTHER PROCEDURE.     _________________________________________ _________________________________     ___________________________________  Signature of Patient     Signature of Responsible Person                   Printed Name of Responsible Person                              _________________________________________ ______________________________        ___________________________________  Signature of Witness         Date  Time         Relationship to Patient    STATEMENT OF PHYSICIAN My signature below affirms that prior to the time of the procedure; I have explained to the patient and/or his/her legal representative, the risks and benefits involved in the proposed treatment and any reasonable alternative to the proposed treatment. I have also explained the risks and benefits involved in refusal of the proposed treatment and alternatives  to the proposed treatment and have answered the patient's questions. If I have a significant financial interest in a co-management agreement or a significant financial interest in any product or implant, or other significant relationship used in this procedure/surgery, I have disclosed this and had a discussion with my patient.     _______________________________________________________________ _____________________________  (Signature of Physician)                                                                                         (Date)                                   (Time)  Patient Name: Taisha Deluca Jing    : 10/5/1980   Printed: 3/4/2024      Medical Record #: [id number]                                              Page 1 of 1

## (undated) NOTE — MR AVS SNAPSHOT
[facility]  [address], [address] [place] [address]  [address] [facility] [phone]               Thank you for choosing us for your health care visit with Amelia Livingston MD.  We are glad to serve you and happy to provide you with this summary of your botox injections, dispense 200 units, use 175 units (25 units unavoidable waste) to be injected by MD during procedure visit per migraine protocol    Assoc Dx:  Chronic migraine [G43.709]          Reason for Today's Visit     Headache           Medical Is Take 75 mg by mouth 3 (three) times daily. Commonly known as:  LYRICA           topiramate 100 MG Tabs   1 tablet 2 times daily   Commonly known as: Topamax           TraMADol HCl 50 MG Tabs   Take 50 mg by mouth.    Commonly known as:  David Patrick Don’t forget strength training with weights and resistance Set goals and track your progress   You don’t need to join a gym. Home exercises work great.  Put more priority on exercise in your life                    Visit [facility] online at

## (undated) NOTE — Clinical Note
AUTHORIZATION FOR SURGICAL OPERATION OR OTHER PROCEDURE    1. I hereby authorize Dr. Bk Simons and the [facility] staff assigned to my case to perform the following operation and/or procedure at the [facility]:    _Botox Injections    2.   My physician h Responsible person                            Spouse  In case of minor or                     Other  _____________   Incompetent name:  __________________________________________________                               (please print)      _____________

## (undated) NOTE — LETTER
AUTHORIZATION FOR SURGICAL OPERATION OR OTHER PROCEDURE    1. I hereby authorize MANOJ WOODRUFF, and [facility] staff assigned to my case to perform the following operation and/or procedure at the [facility] site:    _______________________________________________________________________________________________    IUD EXCHANGE  _______________________________________________________________________________________________    2.  My physician has explained the nature and purpose of the operation or other procedure, possible alternative methods of treatment, the risks involved, and the possibility of complication to me.  I acknowledge that no guarantee has been made as to the result that may be obtained.  3.  I recognize that, during the course of this operation, or other procedure, unforseen conditions may necessitate additional or different procedure than those listed above.  I, therefore, further authorize and request that the above named physician, his/her physician assistants or designees perform such procedures as are, in his/her professional opinion, necessary and desirable.  4.  Any tissue or organs removed in the operation or other procedure may be disposed of by and at the discretion of the [facility] and [facility].  5.  I understand that in the event of a medical emergency, I will be transported by local paramedics to [facility] or other hospital emergency department.  6.  I certify that I have read and fully understand the above consent to operation and/or other procedure.    7.  I acknowledge that my physician has explained sedation/analgesia administration to me including the risks and benefits.  I consent to the administration of sedation/analgesia as may be necessary or desirable in the judgement of my physician.    Witness signature: ___________________________________________________ Date:  ______/______/_____                     Time:  ________ A.M.  P.M.       Patient Name:  ______________________________________________________  (please print)      Patient signature:  ___________________________________________________             Relationship to Patient:           []  Parent    Responsible person                          []  Spouse  In case of minor or                    [] Other  _____________   Incompetent name:  __________________________________________________                               (please print)      _____________      Responsible person  In case of minor or  Incompetent signature:  _______________________________________________    Statement of Physician  My signature below affirms that prior to the time of the procedure, I have explained to the patient and/or his/her guardian, the risks and benefits involved in the proposed treatment and any reasonable alternative to the proposed treatment.  I have also explained the risks and benefits involved in the refusal of the proposed treatment and have answered the patient's questions.                        Date:  ______/______/_______  Provider                      Signature:  __________________________________________________________       Time:  ___________ A.M    P.M.